# Patient Record
Sex: MALE | Employment: FULL TIME | URBAN - METROPOLITAN AREA
[De-identification: names, ages, dates, MRNs, and addresses within clinical notes are randomized per-mention and may not be internally consistent; named-entity substitution may affect disease eponyms.]

---

## 2017-02-21 ENCOUNTER — OFFICE VISIT (OUTPATIENT)
Dept: FAMILY MEDICINE | Facility: CLINIC | Age: 27
End: 2017-02-21
Payer: COMMERCIAL

## 2017-02-21 VITALS
DIASTOLIC BLOOD PRESSURE: 82 MMHG | BODY MASS INDEX: 30.69 KG/M2 | SYSTOLIC BLOOD PRESSURE: 118 MMHG | HEIGHT: 68 IN | TEMPERATURE: 98.9 F | HEART RATE: 86 BPM | WEIGHT: 202.5 LBS | OXYGEN SATURATION: 99 %

## 2017-02-21 DIAGNOSIS — N34.1 URETHRITIS, NONGONOCOCCAL: Primary | ICD-10-CM

## 2017-02-21 PROCEDURE — 99213 OFFICE O/P EST LOW 20 MIN: CPT | Performed by: FAMILY MEDICINE

## 2017-02-21 RX ORDER — CIPROFLOXACIN 500 MG/1
500 TABLET, FILM COATED ORAL 2 TIMES DAILY
Qty: 20 TABLET | Refills: 0 | Status: SHIPPED | OUTPATIENT
Start: 2017-02-21 | End: 2017-03-03

## 2017-02-21 NOTE — NURSING NOTE
"Chief Complaint   Patient presents with     Urinary Problem     burning senation follow up       Initial /82 (BP Location: Left arm, Patient Position: Chair, Cuff Size: Adult Regular)  Pulse 86  Temp 98.9  F (37.2  C) (Oral)  Ht 5' 7.5\" (1.715 m)  Wt 202 lb 8 oz (91.9 kg)  SpO2 99%  BMI 31.25 kg/m2 Estimated body mass index is 31.25 kg/(m^2) as calculated from the following:    Height as of this encounter: 5' 7.5\" (1.715 m).    Weight as of this encounter: 202 lb 8 oz (91.9 kg).  Medication Reconciliation: complete Mallika Anna MA    "

## 2017-02-21 NOTE — MR AVS SNAPSHOT
"              After Visit Summary   2017    Everette Yu    MRN: [id number]           Patient Information     Date Of Birth          1990        Visit Information        Provider Department      2017 3:40 PM Jorge Womack MD [facility]        Today's Diagnoses     Urethritis, nongonococcal    -  1       Follow-ups after your visit        Who to contact     If you have questions or need follow up information about today's clinic visit or your schedule please contact [facility] directly at [phone].  Normal or non-critical lab and imaging results will be communicated to you by [employer], letter or phone within 4 business days after the clinic has received the results. If you do not hear from us within 7 days, please contact the clinic through [employer] or phone. If you have a critical or abnormal lab result, we will notify you by phone as soon as possible.  Submit refill requests through [employer] or call your pharmacy and they will forward the refill request to us. Please allow 3 business days for your refill to be completed.          Additional Information About Your Visit        MyChart Information     [employer] lets you send messages to your doctor, view your test results, renew your prescriptions, schedule appointments and more. To sign up, go to [email or web address] . Click on \"Log in\" on the left side of the screen, which will take you to the Welcome page. Then click on \"Sign up Now\" on the right side of the page.     You will be asked to enter the access code listed below, as well as some personal information. Please follow the directions to create your username and password.     Your access code is: [email or web address]  Expires: 2017  5:02 PM     Your access code will  in 90 days. If you need help or a new code, please call your [facility] or [phone].        Care EveryWhere ID     This is your Care EveryWhere ID. This could be used by " "other organizations to access your [place] [facility] medical records  [id number]        Your Vitals Were     Pulse Temperature Height Pulse Oximetry BMI (Body Mass Index)       86 98.9  F (37.2  C) (Oral) 5' 7.5\" (1.715 m) 99% 31.25 kg/m2        Blood Pressure from Last 3 Encounters:   02/21/17 118/82   02/23/16 136/80    Weight from Last 3 Encounters:   02/21/17 202 lb 8 oz (91.9 kg)   02/23/16 212 lb (96.2 kg)   02/08/16 220 lb (99.8 kg)              Today, you had the following     No orders found for display         Today's Medication Changes          These changes are accurate as of: 2/21/17  5:02 PM.  If you have any questions, ask your nurse or doctor.               Start taking these medicines.        Dose/Directions    ciprofloxacin 500 MG tablet   Commonly known as:  CIPRO   Used for:  Urethritis, nongonococcal   Started by:  Jorge Womack MD        Dose:  500 mg   Take 1 tablet (500 mg) by mouth 2 times daily for 10 days   Quantity:  20 tablet   Refills:  0            Where to get your medicines      These medications were sent to [facility] [address] [place] [address] AT [address] [address]    Hours:  24-hours Phone:  [phone]     ciprofloxacin 500 MG tablet                Primary Care Provider Office Phone # Fax #    Stana Naida Womack [phone]       [place] [id number] [place] [facility] [address]        Thank you!     Thank you for choosing [facility]  for your care. Our goal is always to provide you with excellent care. Hearing back from our patients is one way we can continue to improve our services. Please take a few minutes to complete the written survey that you may receive in the mail after your visit with us. Thank you!             Your Updated Medication List - Protect others around you: Learn how to safely use, store and throw away your medicines at " www.disposemymeds.org.          This list is accurate as of: 2/21/17  5:02 PM.  Always use your most recent med list.                   Brand Name Dispense Instructions for use    ciprofloxacin 500 MG tablet    CIPRO    20 tablet    Take 1 tablet (500 mg) by mouth 2 times daily for 10 days

## 2017-02-21 NOTE — PROGRESS NOTES
"  SUBJECTIVE:                                                    Everette Yu is a 27 year old male who presents to clinic today for the following health issues:      Genitourinary symptoms  Last week pt was seen at  clinic, where he was seen for dysuria. His chlamydia, gonorrhea and UC which were negative. He still has dysuria with some testicular soreness. Last night he was sweating and he has had dizziness. No urethral discharge, abdominal pain, back pain, nausea or vomiting. Pt notes that ejaculate is more watery then white clumps. Pt thinks that medication was bactrim.          Problem list and histories reviewed & adjusted, as indicated.  Additional history: as documented    Problem list, Medication list, Allergies, and Medical/Social/Surgical histories reviewed in EPIC and updated as appropriate.    ROS:  Constitutional, HEENT, cardiovascular, pulmonary, gi and gu systems are negative, except as otherwise noted.    OBJECTIVE:                                                    /82 (BP Location: Left arm, Patient Position: Chair, Cuff Size: Adult Regular)  Pulse 86  Temp 98.9  F (37.2  C) (Oral)  Ht 5' 7.5\" (1.715 m)  Wt 202 lb 8 oz (91.9 kg)  SpO2 99%  BMI 31.25 kg/m2  Body mass index is 31.25 kg/(m^2).  GENERAL: healthy, alert and no distress  EYES: Eyes grossly normal to inspection  HENT: nose and mouth without ulcers or lesions   (male): normal male genitalia without lesions or urethral discharge, no hernia      Diagnostic Test Results:  none      ASSESSMENT/PLAN:                                                      1. Urethritis, nongonococcal  - negative chlamydia, gonorrhea and UC at , no care everywhere for pt   - ciprofloxacin (CIPRO) 500 MG tablet; Take 1 tablet (500 mg) by mouth 2 times daily for 10 days  Dispense: 20 tablet; Refill: 0  - recommended to continue to stay hydrated   - if not improving then will refer to urology for further evaluation    Jorge Womack, " MD  [facility]

## 2017-03-06 DIAGNOSIS — N34.1 URETHRITIS, NONGONOCOCCAL: ICD-10-CM

## 2017-03-06 RX ORDER — CIPROFLOXACIN 500 MG/1
TABLET, FILM COATED ORAL
Qty: 20 TABLET | Refills: 0 | OUTPATIENT
Start: 2017-03-06

## 2017-03-06 NOTE — TELEPHONE ENCOUNTER
Routing to provider - Vu - please review and advise as appropriate  1. Office visit 2/21/2017 - patient second treatment with Cipro for urinary symptoms  - states his symptoms are returning after completing antibiotic  2. Refill request: ciprofloxacin (CIPRO) 500 MG tablet  3. Per plan - do you want to refer to urology?  4. Do you want recheck UAC?    Thank you,  Elie Orona RN

## 2017-03-06 NOTE — TELEPHONE ENCOUNTER
ciproflox      Last Written Prescription Date:  2/21/17  Last Fill Quantity: 20,   # refills: 0  Last Office Visit with [facility], P or  Health prescribing provider: 2/21/17  Future Office visit:       Routing refill request to provider for review/approval because:  Drug not on the [facility], [facility] or  Health refill protocol or controlled substance

## 2017-03-06 NOTE — TELEPHONE ENCOUNTER
Pt wants to speak with someone about getting a refill of his antibiotics.  Has been off of them for 2 day, and the symptoms are coming back.  OK to leave message on voicemail.  Pt uses [facility] on [address]

## 2017-03-06 NOTE — TELEPHONE ENCOUNTER
Detailed message left for patient reviewing recommendation per Dr. Womack and urology referral information.    MARSHA FowlerN, RN

## 2017-03-06 NOTE — TELEPHONE ENCOUNTER
Everette Yu is a 27 year old male who calls with urinary symptoms.    NURSING ASSESSMENT:  Description:  Patient states initially saw at [facility] on [address] for dysuria - Second treatment with Cipro per Vu - day 7 to 10 symptoms improved.  Patient completed antibiotic 2 days ago and now symptoms returning - Patient requesting a refill  Onset/duration:  2 days  Precip. factors:  Previously prescribed  Associated symptoms:   1. Urinary symptoms   -Testicles - sensitive/sore   -Urgency   -nausea  2. Denies burning, itching, odor, fever, discharge, rash, dark colored, urine, blood in urine, cloudy  3. NEW Lower back pain - Middle - 1/10 - states 'working at home and sometimes this chair can give me back pain - maybe I'm being paranoid about it'    Improves/worsens symptoms:  Somewhat with antibiotic/upon stopping antibiotic  Pain scale (0-10)   1/10  LMP/preg/breast feeding:  N/A  Last exam/Treatment:  2/21/2017  Allergies:   Allergies   Allergen Reactions     Gadolinium        MEDICATIONS:   Taking medication(s) as prescribed? Yes  Taking over the counter medication(s?) No  Any medication side effects? No significant side effects    Any barriers to taking medication(s) as prescribed?  No  Medication(s) improving/managing symptoms?  No  Medication reconciliation completed: Yes      NURSING PLAN: Nursing advice to patient discussed will review plan with Vu - if medication is appropriate or referral to Urology - please continue to drink fluids/maintain good hygiene - advised monitor symptoms and return call for fever, flank pain, new/worsening symptoms, testicular pain - patient verbalized understanding - no further questions at this time    RECOMMENDED DISPOSITION:  Home care advice - see above  Will comply with recommendation: Yes  If further questions/concerns or if symptoms do not improve, worsen or new symptoms develop, call your PCP or [facility] as soon as possible.      Guideline used:  Telephone Triage Protocols for Nurses, Fifth Edition, Kyung Orona RN

## 2017-03-07 ENCOUNTER — TELEPHONE (OUTPATIENT)
Dept: FAMILY MEDICINE | Facility: CLINIC | Age: 27
End: 2017-03-07

## 2017-03-07 DIAGNOSIS — N34.1 URETHRITIS, NONGONOCOCCAL: Primary | ICD-10-CM

## 2017-03-07 RX ORDER — CIPROFLOXACIN 500 MG/1
500 TABLET, FILM COATED ORAL 2 TIMES DAILY
Qty: 20 TABLET | Refills: 0 | Status: SHIPPED | OUTPATIENT
Start: 2017-03-07 | End: 2017-03-17

## 2017-03-07 NOTE — TELEPHONE ENCOUNTER
Writer called patient left non detailed message requesting return call to clinic and ask to speak with nurse    Elie Orona RN

## 2017-03-07 NOTE — TELEPHONE ENCOUNTER
Pt was seen by pcp on 2/21/17 for UTI sx.  Sx have come back.  Pt has urologist appt set up, but not for 3 weeks.  Cipro helped, but sx came back .  Sx- urinary burning, urgency. No fevers.    Routing to pcp.  GUY Snow

## 2017-03-08 ENCOUNTER — PRE VISIT (OUTPATIENT)
Dept: UROLOGY | Facility: CLINIC | Age: 27
End: 2017-03-08

## 2017-03-18 ENCOUNTER — TELEPHONE (OUTPATIENT)
Dept: NURSING | Facility: CLINIC | Age: 27
End: 2017-03-18

## 2017-03-18 NOTE — TELEPHONE ENCOUNTER
Call Type: Triage Call    Presenting Problem: Patient calling requesting refill of Cipro.  Patient reporting he has been treated with Cipro x 2 since 2/21/17  clinic visit. After completing Cipro the symptoms returned with  urinary frequency, tender testicles. Afebrile. Patient reporting he  was seen at [facility] and had negative STD and urine  testing,.  Paged on call MD Dr Rich Metzger through [employer] at 1250 pm to call Magaly at [facility]. Dr Darvin Metzger  returned call reporting that patient should be reseen prior to  further refills. Stating patient has completed 20 days of Cipro and  symptoms have returned. Patient notified and verbalizes  understanding.  Triage Note:  Guideline Title: Urinary Symptoms - Male  Recommended Disposition: See Provider within 8 Hours  Original Inclination: Did not know what to do  Override Disposition:  Intended Action: Follow advice given  Physician Contacted: Yes  Urinary tract symptoms AND any flank or low back, penis or scrotal pain ?  YES  Blood in urine ? NO  Flank pain ? NO  New or worsening signs and symptoms that may indicate shock ? NO  Sudden onset of pain in testicle(s), groin, or lower abdomen AND testicle(s)  swollen or tender to touch ? NO  Unbearable abdominal/pelvic pain ? NO  Current or recent urinary tract instrumentation AND urinary tract symptoms OR no  urine flow ? NO  Urinary tract symptoms AND fever 101.5 F (38.6 C) or higher or vomiting ? NO  No urination for 12 or more hours ? NO  Any temperature elevation in an immunocompromised individual OR frail elderly with  signs of dehydration ? NO  Any other unexpected urinary symptoms following urinary tract or abdominal surgery  within timeframe specified by provider ? NO  Physician Instructions:  Care Advice:

## 2018-01-13 ENCOUNTER — APPOINTMENT (OUTPATIENT)
Dept: CT IMAGING | Facility: CLINIC | Age: 28
End: 2018-01-13
Attending: EMERGENCY MEDICINE
Payer: COMMERCIAL

## 2018-01-13 ENCOUNTER — ANESTHESIA EVENT (OUTPATIENT)
Dept: SURGERY | Facility: CLINIC | Age: 28
End: 2018-01-13
Payer: COMMERCIAL

## 2018-01-13 ENCOUNTER — ANESTHESIA (OUTPATIENT)
Dept: SURGERY | Facility: CLINIC | Age: 28
End: 2018-01-13
Payer: COMMERCIAL

## 2018-01-13 ENCOUNTER — HOSPITAL ENCOUNTER (EMERGENCY)
Facility: CLINIC | Age: 28
Discharge: HOME | End: 2018-01-13
Attending: EMERGENCY MEDICINE | Admitting: SURGERY
Payer: COMMERCIAL

## 2018-01-13 VITALS
HEART RATE: 80 BPM | TEMPERATURE: 99.5 F | BODY MASS INDEX: 30.36 KG/M2 | SYSTOLIC BLOOD PRESSURE: 127 MMHG | OXYGEN SATURATION: 97 % | RESPIRATION RATE: 16 BRPM | DIASTOLIC BLOOD PRESSURE: 76 MMHG | WEIGHT: 205 LBS | HEIGHT: 69 IN

## 2018-01-13 DIAGNOSIS — K35.80 ACUTE APPENDICITIS, UNSPECIFIED ACUTE APPENDICITIS TYPE: ICD-10-CM

## 2018-01-13 LAB
ALBUMIN UR-MCNC: NEGATIVE MG/DL
ANION GAP SERPL CALCULATED.3IONS-SCNC: 5 MMOL/L (ref 3–14)
APPEARANCE UR: CLEAR
BASOPHILS # BLD AUTO: 0 10E9/L (ref 0–0.2)
BASOPHILS NFR BLD AUTO: 0.1 %
BILIRUB UR QL STRIP: NEGATIVE
BUN SERPL-MCNC: 9 MG/DL (ref 7–30)
CALCIUM SERPL-MCNC: 9.3 MG/DL (ref 8.5–10.1)
CHLORIDE SERPL-SCNC: 107 MMOL/L (ref 94–109)
CO2 SERPL-SCNC: 27 MMOL/L (ref 20–32)
COLOR UR AUTO: YELLOW
CREAT SERPL-MCNC: 0.98 MG/DL (ref 0.66–1.25)
CRP SERPL-MCNC: 16.5 MG/L (ref 0–8)
DIFFERENTIAL METHOD BLD: NORMAL
EOSINOPHIL # BLD AUTO: 0 10E9/L (ref 0–0.7)
EOSINOPHIL NFR BLD AUTO: 0.4 %
ERYTHROCYTE [DISTWIDTH] IN BLOOD BY AUTOMATED COUNT: 12.5 % (ref 10–15)
GFR SERPL CREATININE-BSD FRML MDRD: >90 ML/MIN/1.7M2
GLUCOSE SERPL-MCNC: 96 MG/DL (ref 70–99)
GLUCOSE UR STRIP-MCNC: NEGATIVE MG/DL
HCT VFR BLD AUTO: 43.6 % (ref 40–53)
HGB BLD-MCNC: 15.3 G/DL (ref 13.3–17.7)
HGB UR QL STRIP: NEGATIVE
IMM GRANULOCYTES # BLD: 0 10E9/L (ref 0–0.4)
IMM GRANULOCYTES NFR BLD: 0.4 %
KETONES UR STRIP-MCNC: 5 MG/DL
LEUKOCYTE ESTERASE UR QL STRIP: NEGATIVE
LYMPHOCYTES # BLD AUTO: 1.6 10E9/L (ref 0.8–5.3)
LYMPHOCYTES NFR BLD AUTO: 14.7 %
MCH RBC QN AUTO: 30.2 PG (ref 26.5–33)
MCHC RBC AUTO-ENTMCNC: 35.1 G/DL (ref 31.5–36.5)
MCV RBC AUTO: 86 FL (ref 78–100)
MONOCYTES # BLD AUTO: 1 10E9/L (ref 0–1.3)
MONOCYTES NFR BLD AUTO: 9.1 %
MUCOUS THREADS #/AREA URNS LPF: PRESENT /LPF
NEUTROPHILS # BLD AUTO: 8.1 10E9/L (ref 1.6–8.3)
NEUTROPHILS NFR BLD AUTO: 75.3 %
NITRATE UR QL: NEGATIVE
NRBC # BLD AUTO: 0 10*3/UL
NRBC BLD AUTO-RTO: 0 /100
PH UR STRIP: 7 PH (ref 5–7)
PLATELET # BLD AUTO: 226 10E9/L (ref 150–450)
POTASSIUM SERPL-SCNC: 3.7 MMOL/L (ref 3.4–5.3)
RBC # BLD AUTO: 5.07 10E12/L (ref 4.4–5.9)
RBC #/AREA URNS AUTO: 1 /HPF (ref 0–2)
SODIUM SERPL-SCNC: 139 MMOL/L (ref 133–144)
SOURCE: ABNORMAL
SP GR UR STRIP: 1.01 (ref 1–1.03)
UROBILINOGEN UR STRIP-MCNC: NORMAL MG/DL (ref 0–2)
WBC # BLD AUTO: 10.7 10E9/L (ref 4–11)
WBC #/AREA URNS AUTO: <1 /HPF (ref 0–2)

## 2018-01-13 PROCEDURE — 27210794 ZZH OR GENERAL SUPPLY STERILE: Performed by: SURGERY

## 2018-01-13 PROCEDURE — 27210995 ZZH RX 272: Performed by: EMERGENCY MEDICINE

## 2018-01-13 PROCEDURE — 37000008 ZZH ANESTHESIA TECHNICAL FEE, 1ST 30 MIN: Performed by: SURGERY

## 2018-01-13 PROCEDURE — 25000566 ZZH SEVOFLURANE, EA 15 MIN: Performed by: SURGERY

## 2018-01-13 PROCEDURE — 44970 LAPAROSCOPY APPENDECTOMY: CPT | Mod: AS | Performed by: PHYSICIAN ASSISTANT

## 2018-01-13 PROCEDURE — 88304 TISSUE EXAM BY PATHOLOGIST: CPT | Performed by: SURGERY

## 2018-01-13 PROCEDURE — 74177 CT ABD & PELVIS W/CONTRAST: CPT

## 2018-01-13 PROCEDURE — 25000125 ZZHC RX 250: Performed by: NURSE ANESTHETIST, CERTIFIED REGISTERED

## 2018-01-13 PROCEDURE — 36000058 ZZH SURGERY LEVEL 3 EA 15 ADDTL MIN: Performed by: SURGERY

## 2018-01-13 PROCEDURE — 96375 TX/PRO/DX INJ NEW DRUG ADDON: CPT

## 2018-01-13 PROCEDURE — 80048 BASIC METABOLIC PNL TOTAL CA: CPT | Performed by: EMERGENCY MEDICINE

## 2018-01-13 PROCEDURE — 71000013 ZZH RECOVERY PHASE 1 LEVEL 1 EA ADDTL HR: Performed by: SURGERY

## 2018-01-13 PROCEDURE — 25000128 H RX IP 250 OP 636: Performed by: NURSE ANESTHETIST, CERTIFIED REGISTERED

## 2018-01-13 PROCEDURE — 36000056 ZZH SURGERY LEVEL 3 1ST 30 MIN: Performed by: SURGERY

## 2018-01-13 PROCEDURE — 81001 URINALYSIS AUTO W/SCOPE: CPT | Performed by: EMERGENCY MEDICINE

## 2018-01-13 PROCEDURE — 25000128 H RX IP 250 OP 636: Performed by: EMERGENCY MEDICINE

## 2018-01-13 PROCEDURE — 25000125 ZZHC RX 250: Performed by: EMERGENCY MEDICINE

## 2018-01-13 PROCEDURE — 25800025 ZZH RX 258: Performed by: SURGERY

## 2018-01-13 PROCEDURE — 99285 EMERGENCY DEPT VISIT HI MDM: CPT | Mod: 25

## 2018-01-13 PROCEDURE — 88304 TISSUE EXAM BY PATHOLOGIST: CPT | Mod: 26 | Performed by: SURGERY

## 2018-01-13 PROCEDURE — 25000125 ZZHC RX 250: Performed by: SURGERY

## 2018-01-13 PROCEDURE — 85025 COMPLETE CBC W/AUTO DIFF WBC: CPT | Performed by: EMERGENCY MEDICINE

## 2018-01-13 PROCEDURE — 25000128 H RX IP 250 OP 636: Performed by: SURGERY

## 2018-01-13 PROCEDURE — 25000132 ZZH RX MED GY IP 250 OP 250 PS 637: Performed by: PHYSICIAN ASSISTANT

## 2018-01-13 PROCEDURE — 86140 C-REACTIVE PROTEIN: CPT | Performed by: EMERGENCY MEDICINE

## 2018-01-13 PROCEDURE — 37000009 ZZH ANESTHESIA TECHNICAL FEE, EACH ADDTL 15 MIN: Performed by: SURGERY

## 2018-01-13 PROCEDURE — 40000170 ZZH STATISTIC PRE-PROCEDURE ASSESSMENT II: Performed by: SURGERY

## 2018-01-13 PROCEDURE — 71000012 ZZH RECOVERY PHASE 1 LEVEL 1 FIRST HR: Performed by: SURGERY

## 2018-01-13 PROCEDURE — 44970 LAPAROSCOPY APPENDECTOMY: CPT | Performed by: SURGERY

## 2018-01-13 PROCEDURE — 96374 THER/PROPH/DIAG INJ IV PUSH: CPT | Mod: 59

## 2018-01-13 PROCEDURE — 99204 OFFICE O/P NEW MOD 45 MIN: CPT | Mod: 57 | Performed by: SURGERY

## 2018-01-13 PROCEDURE — 27210995 ZZH RX 272: Performed by: SURGERY

## 2018-01-13 RX ORDER — BUPIVACAINE HYDROCHLORIDE 2.5 MG/ML
INJECTION, SOLUTION INFILTRATION; PERINEURAL PRN
Status: DISCONTINUED | OUTPATIENT
Start: 2018-01-13 | End: 2018-01-13 | Stop reason: HOSPADM

## 2018-01-13 RX ORDER — ONDANSETRON 2 MG/ML
4 INJECTION INTRAMUSCULAR; INTRAVENOUS EVERY 30 MIN PRN
Status: DISCONTINUED | OUTPATIENT
Start: 2018-01-13 | End: 2018-01-13 | Stop reason: HOSPADM

## 2018-01-13 RX ORDER — PROPOFOL 10 MG/ML
INJECTION, EMULSION INTRAVENOUS PRN
Status: DISCONTINUED | OUTPATIENT
Start: 2018-01-13 | End: 2018-01-13

## 2018-01-13 RX ORDER — HYDROCODONE BITARTRATE AND ACETAMINOPHEN 5; 325 MG/1; MG/1
1 TABLET ORAL
Status: COMPLETED | OUTPATIENT
Start: 2018-01-13 | End: 2018-01-13

## 2018-01-13 RX ORDER — DEXAMETHASONE SODIUM PHOSPHATE 4 MG/ML
INJECTION, SOLUTION INTRA-ARTICULAR; INTRALESIONAL; INTRAMUSCULAR; INTRAVENOUS; SOFT TISSUE PRN
Status: DISCONTINUED | OUTPATIENT
Start: 2018-01-13 | End: 2018-01-13

## 2018-01-13 RX ORDER — SODIUM CHLORIDE, SODIUM LACTATE, POTASSIUM CHLORIDE, CALCIUM CHLORIDE 600; 310; 30; 20 MG/100ML; MG/100ML; MG/100ML; MG/100ML
INJECTION, SOLUTION INTRAVENOUS CONTINUOUS
Status: DISCONTINUED | OUTPATIENT
Start: 2018-01-13 | End: 2018-01-13 | Stop reason: HOSPADM

## 2018-01-13 RX ORDER — ONDANSETRON 4 MG/1
4 TABLET, ORALLY DISINTEGRATING ORAL EVERY 30 MIN PRN
Status: DISCONTINUED | OUTPATIENT
Start: 2018-01-13 | End: 2018-01-13 | Stop reason: HOSPADM

## 2018-01-13 RX ORDER — GLYCOPYRROLATE 0.2 MG/ML
INJECTION, SOLUTION INTRAMUSCULAR; INTRAVENOUS PRN
Status: DISCONTINUED | OUTPATIENT
Start: 2018-01-13 | End: 2018-01-13

## 2018-01-13 RX ORDER — NEOSTIGMINE METHYLSULFATE 1 MG/ML
VIAL (ML) INJECTION PRN
Status: DISCONTINUED | OUTPATIENT
Start: 2018-01-13 | End: 2018-01-13

## 2018-01-13 RX ORDER — FENTANYL CITRATE 0.05 MG/ML
25-50 INJECTION, SOLUTION INTRAMUSCULAR; INTRAVENOUS
Status: DISCONTINUED | OUTPATIENT
Start: 2018-01-13 | End: 2018-01-13 | Stop reason: HOSPADM

## 2018-01-13 RX ORDER — MAGNESIUM HYDROXIDE 1200 MG/15ML
LIQUID ORAL PRN
Status: DISCONTINUED | OUTPATIENT
Start: 2018-01-13 | End: 2018-01-13 | Stop reason: HOSPADM

## 2018-01-13 RX ORDER — MEPERIDINE HYDROCHLORIDE 25 MG/ML
12.5 INJECTION INTRAMUSCULAR; INTRAVENOUS; SUBCUTANEOUS EVERY 5 MIN PRN
Status: DISCONTINUED | OUTPATIENT
Start: 2018-01-13 | End: 2018-01-13 | Stop reason: HOSPADM

## 2018-01-13 RX ORDER — LIDOCAINE HYDROCHLORIDE 20 MG/ML
INJECTION, SOLUTION INFILTRATION; PERINEURAL PRN
Status: DISCONTINUED | OUTPATIENT
Start: 2018-01-13 | End: 2018-01-13

## 2018-01-13 RX ORDER — KETOROLAC TROMETHAMINE 30 MG/ML
INJECTION, SOLUTION INTRAMUSCULAR; INTRAVENOUS PRN
Status: DISCONTINUED | OUTPATIENT
Start: 2018-01-13 | End: 2018-01-13

## 2018-01-13 RX ORDER — HYDROCODONE BITARTRATE AND ACETAMINOPHEN 5; 325 MG/1; MG/1
1-2 TABLET ORAL EVERY 6 HOURS PRN
Qty: 30 TABLET | Refills: 0 | Status: SHIPPED | OUTPATIENT
Start: 2018-01-13

## 2018-01-13 RX ORDER — SODIUM CHLORIDE, SODIUM LACTATE, POTASSIUM CHLORIDE, CALCIUM CHLORIDE 600; 310; 30; 20 MG/100ML; MG/100ML; MG/100ML; MG/100ML
INJECTION, SOLUTION INTRAVENOUS CONTINUOUS PRN
Status: DISCONTINUED | OUTPATIENT
Start: 2018-01-13 | End: 2018-01-13

## 2018-01-13 RX ORDER — NALOXONE HYDROCHLORIDE 0.4 MG/ML
.1-.4 INJECTION, SOLUTION INTRAMUSCULAR; INTRAVENOUS; SUBCUTANEOUS
Status: DISCONTINUED | OUTPATIENT
Start: 2018-01-13 | End: 2018-01-13 | Stop reason: HOSPADM

## 2018-01-13 RX ORDER — IOPAMIDOL 755 MG/ML
103 INJECTION, SOLUTION INTRAVASCULAR ONCE
Status: COMPLETED | OUTPATIENT
Start: 2018-01-13 | End: 2018-01-13

## 2018-01-13 RX ORDER — KETOROLAC TROMETHAMINE 30 MG/ML
30 INJECTION, SOLUTION INTRAMUSCULAR; INTRAVENOUS
Status: DISCONTINUED | OUTPATIENT
Start: 2018-01-13 | End: 2018-01-13 | Stop reason: HOSPADM

## 2018-01-13 RX ORDER — ONDANSETRON 2 MG/ML
INJECTION INTRAMUSCULAR; INTRAVENOUS PRN
Status: DISCONTINUED | OUTPATIENT
Start: 2018-01-13 | End: 2018-01-13

## 2018-01-13 RX ORDER — SENNOSIDES A AND B 8.6 MG/1
1 TABLET, FILM COATED ORAL 2 TIMES DAILY
Qty: 10 TABLET | Refills: 1 | Status: SHIPPED | OUTPATIENT
Start: 2018-01-13

## 2018-01-13 RX ORDER — EPINEPHRINE 1 MG/ML
0.3 INJECTION, SOLUTION, CONCENTRATE INTRAVENOUS
Status: COMPLETED | OUTPATIENT
Start: 2018-01-13 | End: 2018-01-13

## 2018-01-13 RX ORDER — CALCIUM CARBONATE 500 MG/1
2 TABLET, CHEWABLE ORAL DAILY PRN
COMMUNITY

## 2018-01-13 RX ORDER — FENTANYL CITRATE 50 UG/ML
INJECTION, SOLUTION INTRAMUSCULAR; INTRAVENOUS PRN
Status: DISCONTINUED | OUTPATIENT
Start: 2018-01-13 | End: 2018-01-13

## 2018-01-13 RX ORDER — HYDROMORPHONE HYDROCHLORIDE 1 MG/ML
.3-.5 INJECTION, SOLUTION INTRAMUSCULAR; INTRAVENOUS; SUBCUTANEOUS EVERY 5 MIN PRN
Status: DISCONTINUED | OUTPATIENT
Start: 2018-01-13 | End: 2018-01-13 | Stop reason: HOSPADM

## 2018-01-13 RX ORDER — MORPHINE SULFATE 4 MG/ML
6 INJECTION, SOLUTION INTRAMUSCULAR; INTRAVENOUS ONCE
Status: COMPLETED | OUTPATIENT
Start: 2018-01-13 | End: 2018-01-13

## 2018-01-13 RX ADMIN — DEXAMETHASONE SODIUM PHOSPHATE 4 MG: 4 INJECTION, SOLUTION INTRA-ARTICULAR; INTRALESIONAL; INTRAMUSCULAR; INTRAVENOUS; SOFT TISSUE at 16:09

## 2018-01-13 RX ADMIN — MIDAZOLAM 2 MG: 1 INJECTION INTRAMUSCULAR; INTRAVENOUS at 15:53

## 2018-01-13 RX ADMIN — NEOSTIGMINE METHYLSULFATE 5 MG: 1 INJECTION INTRAMUSCULAR; INTRAVENOUS; SUBCUTANEOUS at 16:40

## 2018-01-13 RX ADMIN — FENTANYL CITRATE 25 MCG: 50 INJECTION, SOLUTION INTRAMUSCULAR; INTRAVENOUS at 17:38

## 2018-01-13 RX ADMIN — LIDOCAINE HYDROCHLORIDE 100 MG: 20 INJECTION, SOLUTION INFILTRATION; PERINEURAL at 15:56

## 2018-01-13 RX ADMIN — FENTANYL CITRATE 50 MCG: 50 INJECTION, SOLUTION INTRAMUSCULAR; INTRAVENOUS at 16:02

## 2018-01-13 RX ADMIN — SODIUM CHLORIDE, POTASSIUM CHLORIDE, SODIUM LACTATE AND CALCIUM CHLORIDE: 600; 310; 30; 20 INJECTION, SOLUTION INTRAVENOUS at 15:00

## 2018-01-13 RX ADMIN — SODIUM CHLORIDE 71 ML: 9 INJECTION, SOLUTION INTRAVENOUS at 13:18

## 2018-01-13 RX ADMIN — HYDROCODONE BITARTRATE AND ACETAMINOPHEN 1 TABLET: 5; 325 TABLET ORAL at 17:31

## 2018-01-13 RX ADMIN — FENTANYL CITRATE 25 MCG: 50 INJECTION, SOLUTION INTRAMUSCULAR; INTRAVENOUS at 17:51

## 2018-01-13 RX ADMIN — ONDANSETRON 4 MG: 2 INJECTION INTRAMUSCULAR; INTRAVENOUS at 16:09

## 2018-01-13 RX ADMIN — SUCCINYLCHOLINE CHLORIDE 100 MG: 20 INJECTION, SOLUTION INTRAMUSCULAR; INTRAVENOUS at 15:56

## 2018-01-13 RX ADMIN — Medication 0.3 MG: at 17:14

## 2018-01-13 RX ADMIN — WATER 1 G: 1 INJECTION INTRAMUSCULAR; INTRAVENOUS; SUBCUTANEOUS at 14:20

## 2018-01-13 RX ADMIN — ROCURONIUM BROMIDE 35 MG: 10 INJECTION INTRAVENOUS at 16:10

## 2018-01-13 RX ADMIN — GLYCOPYRROLATE 0.8 MG: 0.2 INJECTION, SOLUTION INTRAMUSCULAR; INTRAVENOUS at 16:40

## 2018-01-13 RX ADMIN — FENTANYL CITRATE 50 MCG: 50 INJECTION, SOLUTION INTRAMUSCULAR; INTRAVENOUS at 16:57

## 2018-01-13 RX ADMIN — MORPHINE SULFATE 6 MG: 4 INJECTION INTRAVENOUS at 14:39

## 2018-01-13 RX ADMIN — KETOROLAC TROMETHAMINE 30 MG: 30 INJECTION, SOLUTION INTRAMUSCULAR at 16:38

## 2018-01-13 RX ADMIN — Medication 0.3 MG: at 17:17

## 2018-01-13 RX ADMIN — FENTANYL CITRATE 50 MCG: 50 INJECTION, SOLUTION INTRAMUSCULAR; INTRAVENOUS at 15:53

## 2018-01-13 RX ADMIN — FENTANYL CITRATE 50 MCG: 50 INJECTION, SOLUTION INTRAMUSCULAR; INTRAVENOUS at 16:09

## 2018-01-13 RX ADMIN — IOPAMIDOL 103 ML: 755 INJECTION, SOLUTION INTRAVENOUS at 13:18

## 2018-01-13 RX ADMIN — SODIUM CHLORIDE, POTASSIUM CHLORIDE, SODIUM LACTATE AND CALCIUM CHLORIDE: 600; 310; 30; 20 INJECTION, SOLUTION INTRAVENOUS at 16:13

## 2018-01-13 RX ADMIN — PROPOFOL 300 MG: 10 INJECTION, EMULSION INTRAVENOUS at 15:56

## 2018-01-13 RX ADMIN — SODIUM CHLORIDE, POTASSIUM CHLORIDE, SODIUM LACTATE AND CALCIUM CHLORIDE: 600; 310; 30; 20 INJECTION, SOLUTION INTRAVENOUS at 15:53

## 2018-01-13 ASSESSMENT — ENCOUNTER SYMPTOMS
HEMATURIA: 0
DYSURIA: 0
CHILLS: 1
ABDOMINAL PAIN: 1
FEVER: 0
DIAPHORESIS: 1

## 2018-01-13 NOTE — PHARMACY-ADMISSION MEDICATION HISTORY
Admission medication history interview status for the 1/13/2018  admission is complete. See EPIC admission navigator for prior to admission medications     Medication history source reliability:Good    Actions taken by pharmacist (provider contacted, etc):discussed with patient      Additional medication history information not noted on PTA med list :None    Medication reconciliation/reorder completed by provider prior to medication history? No    Time spent in this activity: 10 minutes     Prior to Admission medications    Medication Sig Last Dose Taking? Auth Provider   calcium carbonate (TUMS) 500 MG chewable tablet Take 2 chew tab by mouth daily as needed for heartburn 1/13/2018 at Unknown time Yes Unknown, Entered By History   MELATONIN PO Take 3 mg by mouth nightly as needed 1/12/2018 at Unknown time Yes Unknown, Entered By History   magnesium hydroxide (MILK OF MAGNESIA) 400 MG/5ML suspension Take 30 mLs by mouth daily as needed for constipation or heartburn 1/13/2018 at Unknown time Yes Unknown, Entered By History

## 2018-01-13 NOTE — ED NOTES
Pt reports increase in abdominal pain, now constant and rated 8/10. Pt requesting analgesia. Pending Surgery consult. Dr. Mejia made aware.

## 2018-01-13 NOTE — DISCHARGE INSTRUCTIONS
[facility]   Discharge Instructions for Laparoscopic Appendectomy          ACTIVITY    You may climb stairs.    You may lift or exercise after four days if comfortable.    You may drive without restrictions when not using prescription pain medication.    You may return to work when comfortable.    BATHING    You may take a shower.     Do not submerge No baths, pools or hot tubs for 2 weeks    You may have steri-strips (looks like tape) on your incision.  You may remove them after 1 week     DIET    Return to the diet you were on before surgery.    CALL YOUR PHYSICIAN IF YOU HAVE    Chills or fever above 101   F.    Drainage from the incisions    Significant bleeding    Pain not relieved by your pain medication or rest.    Increasing pain after the first 48 hours    HELPFUL HINTS    You may experience shoulder pain which is due to the air injected during the procedure.  This is temporary and usually passes in a day.    Same Day Surgery Discharge Instructions for  Sedation and General Anesthesia       It's not unusual to feel dizzy, light-headed or faint for up to 24 hours after surgery or while taking pain medication.  If you have these symptoms: sit for a few minutes before standing and have someone assist you when you get up to walk or use the bathroom.      You should rest and relax for the next 24 hours. We recommend you make arrangements to have an adult stay with you for at least 24 hours after your discharge.  Avoid hazardous and strenuous activity.      DO NOT DRIVE any vehicle or operate mechanical equipment for 24 hours following the end of your surgery.  Even though you may feel normal, your reactions may be affected by the medication you have received.      Do not drink alcoholic beverages for 24 hours following surgery.       Slowly progress to your regular diet as you feel able. It's not unusual to feel nauseated and/or vomit after receiving anesthesia.  If you develop these symptoms,  drink clear liquids (apple juice, ginger ale, broth, 7-up, etc. ) until you feel better.  If your nausea and vomiting persists for 24 hours, please notify your surgeon.        All narcotic pain medications, along with inactivity and anesthesia, can cause constipation. Drinking plenty of liquids and increasing fiber intake will help.      For any questions of a medical nature, call your surgeon.      Do not make important decisions for 24 hours.      If you had general anesthesia, you may have a sore throat for a couple of days related to the breathing tube used during surgery.  You may use Cepacol lozenges to help with this discomfort.  If it worsens or if you develop a fever, contact your surgeon.       If you feel your pain is not well managed with the pain medications prescribed by your surgeon, please contact your surgeon's office to let them know so they can address your concerns.       **If you have questions or concerns about your procedure,   call Dr Brewer at  [phone]**    While you were at the hospital today you received Toradol, an antiinflammatory medication similar to Ibuprofen.  You should not take other antiinflammatory medication, such as Ibuprofen, Motrin, Advil, Aleve, Naprosyn, etc, until 10:30pm.

## 2018-01-13 NOTE — ANESTHESIA CARE TRANSFER NOTE
Patient: Everette Yu    Procedure(s):  Laparoscopic Appendectomy - Wound Class: II-Clean Contaminated    Diagnosis: unknown  Diagnosis Additional Information: No value filed.    Anesthesia Type:   General, RSI     Note:  Airway :Face Mask  Patient transferred to:PACU  Handoff Report: Identifed the Patient, Identified the Reponsible Provider, Reviewed the pertinent medical history, Discussed the surgical course, Reviewed Intra-OP anesthesia mangement and issues during anesthesia, Set expectations for post-procedure period and Allowed opportunity for questions and acknowledgement of understanding      Vitals: (Last set prior to Anesthesia Care Transfer)    CRNA VITALS  1/13/2018 1618 - 1/13/2018 1657      1/13/2018             Pulse: 106    SpO2: 93 %    Resp Rate (observed): 20    Resp Rate (set): 10                Electronically Signed By: MAXIMILIANO Riggs CRNA  January 13, 2018  4:57 PM

## 2018-01-13 NOTE — CONSULTS
General Surgery [facility] Consultation/H&P    Everette Yu MRN#: [id number]   Age: 27 year old YOB: 1990     Date of Admission:          1/13/2018  Reason for consult/H&P: Abdominal pain   Surgeon:      Salbador Brewer MD                  Chief Complaint:   Abdominal pain, right lower quadrant         History of Present Illness:   This patient is a 27 year old  male who presented to the [facility] ER with abdominal pain. This developed over two days.  He has had no similar pain in the past. He has had no abdominal operations.  He denies trauma to the abdomen. Denies fever, chills, nausea, vomiting, change in BM or urination.   Denies having any previous episodes or abdominal surgery. History is obtained from the patient and chart.         Past Medical History:    has a past medical history of Chronic prostatitis.          Past Surgical History:     Past Surgical History:   Procedure Laterality Date     ORTHOPEDIC SURGERY      acl X 2             Medications:     Prior to Admission medications    Medication Sig Start Date End Date Taking? Authorizing Provider   calcium carbonate (TUMS) 500 MG chewable tablet Take 2 chew tab by mouth daily as needed for heartburn   Yes Unknown, Entered By History   MELATONIN PO Take 3 mg by mouth nightly as needed   Yes Unknown, Entered By History   magnesium hydroxide (MILK OF MAGNESIA) 400 MG/5ML suspension Take 30 mLs by mouth daily as needed for constipation or heartburn   Yes Unknown, Entered By History            Allergies:   No Known Allergies         Social History:     Social History   Substance Use Topics     Smoking status: Never Smoker     Smokeless tobacco: Never Used     Alcohol use Yes             Family History:    This patient has no significant relevant family history.  Family history is reviewed in detail.          Review of Systems:   Brief ROS is negative other than noted in the HPI.  C: NEGATIVE for fever, chills, change in  "weight  R: NEGATIVE for significant cough or SOB  CV: NEGATIVE for chest pain, palpitations or peripheral edema  GI:  NEGATIVE for dysuria, heartburn, or change in bowel habits  H: NEGATIVE for bleeding problems         Physical Exam:   Blood pressure 146/90, pulse 80, temperature 98.3  F (36.8  C), resp. rate 20, height 1.753 m (5' 9\"), weight 93 kg (205 lb), SpO2 98 %.       General - This is a well developed, well nourished male .  HEENT - Normocephalic. Atraumatic. Moist mucous membranes. Pupils equal.  No scleral icterus. Nose normal.  Neck - Supple without masses. No cervical adenopathy or thyromegaly  Lungs - Breathing not labored  Chest - Not tender. CVA's nontender  Heart - Regular rate & rhythm   Abdomen - Soft, tender in RLQ nondistended with +bowel sounds, no organomegaly.  Extremities - Moves all extremities. Warm without edema.  Neurologic - Nonfocal.          Data:   Labs:  Recent Labs   Lab Test  01/13/18   1230  02/23/16   1605   WBC  10.7   --    HGB  15.3  15.6   HCT  43.6   --    PLT  226   --      Recent Labs   Lab Test  01/13/18   1230   POTASSIUM  3.7   CHLORIDE  107   CO2  27   BUN  9   CR  0.98     No lab results found.  No lab results found.  Recent Labs   Lab Test  01/13/18   1230   LORE  9.3     Recent Labs   Lab Test  01/13/18   1230   ANIONGAP  5   PROTEIN  Negative       CT scan of the abdomen: reviewed. Looks like appendicitis without rupture    Ultrasound of the abdomen:  Not done               Assessment:   Probable appendicitis         Plan:      Lap exploration. Risks and options reviewed with patient and wife     I have discussed the history, physical, and plan with the patient.   Salbador Brewer M.D.       "

## 2018-01-13 NOTE — ED NOTES
"[facility]  ED Nurse Handoff Report    ED Chief complaint: Abdominal Pain (lower abdominal pain over past 2 days, worse since last night)      ED Diagnosis:   Final diagnoses:   Acute appendicitis, unspecified acute appendicitis type       Code Status: Full Code    Allergies: No Known Allergies    Activity level - Baseline/Home:  Independent    Activity Level - Current:   Independent     Needed?: No    Isolation: No  Infection: Not Applicable    Bariatric?: No    Vital Signs:   Vitals:    01/13/18 1213 01/13/18 1355   BP: (!) 146/97 144/84   Pulse: 79 80   Resp: 18    Temp: 98.3  F (36.8  C)    SpO2: 99% 99%   Weight: 93 kg (205 lb)    Height: 1.753 m (5' 9\")        Cardiac Rhythm: Heart regular    Is this patient confused?: No    Patient Report: Initial Complaint: Periumbilical/suprapubic abdominal pain since 1/11/18, worsened 1/12/18. No nausea or vomiting. Subjective fevers and chills present with constipation. Last BM 1/12/18 without blood. Also having urinary hesitancy. Hx of chronic prostatitis - last antibiotics 7 months ago. Localized RLQ and periumbilical tenderness to palpation with rebound tenderness. Labs unremarkable aside from elevated CRP. CT showed acute appendicitis. No history of abdominal surgeries. Surgery consulted - pt to go to pre-op from ED at 1435. Received Invanz (1g) & morphine (6mg). Of note, the patient thought he could have a reaction to IV dye due to a prior HepB vaccine administered while he lived in [place]. He was told at that point to not take dye. Today, we gave him contrast dye (IV only) and he did not have any reaction. SC epinephrine was ordered PRN, but was not given. Dye removed from allergy list.    Family Comments: Wife, Magaly, at bedside. Very pleasant.    OBS brochure/video discussed/provided to patient: N/A    ED Medications:   Medications   morphine (PF) injection 6 mg (not administered)   EPINEPHrine PF (ADRENALIN) injection 0.3 mg ( " Intramuscular Return to Cabinet 1/13/18 1337)   100mL saline flush (71 mLs Intravenous Given 1/13/18 1318)   iopamidol (ISOVUE-370) solution 103 mL (103 mLs Intravenous Given 1/13/18 1318)   ertapenem (INVanz) 1 g in 10 mL SWFI for IVP (1 g Intravenous Given 1/13/18 1420)       Drips infusing?:  No      ED NURSE PHONE NUMBER: [phone]

## 2018-01-13 NOTE — IP AVS SNAPSHOT
[place] [address]    [place] [address]    Phone:  [phone]                                       After Visit Summary   1/13/2018    Everette Yu    MRN: [id number]           After Visit Summary Signature Page     I have received my discharge instructions, and my questions have been answered. I have discussed any challenges I see with this plan with the nurse or doctor.    ..........................................................................................................................................  Patient/Patient Representative Signature      ..........................................................................................................................................  Patient Representative Print Name and Relationship to Patient    ..................................................               ................................................  Date                                            Time    ..........................................................................................................................................  Reviewed by Signature/Title    ...................................................              ..............................................  Date                                                            Time

## 2018-01-13 NOTE — IP AVS SNAPSHOT
MRN:[id number]                      After Visit Summary   1/13/2018    Everette Yu    MRN: [id number]           Thank you!     Thank you for choosing [facility] for your care. Our goal is always to provide you with excellent care. Hearing back from our patients is one way we can continue to improve our services. Please take a few minutes to complete the written survey that you may receive in the mail after you visit with us. Thank you!        Patient Information     Date Of Birth          1990        About your hospital stay     You were admitted on:  N/A You last received care in the:  [facility] PACU    You were discharged on:  January 13, 2018       Who to Call     For medical emergencies, please call 911.  For non-urgent questions about your medical care, please call your primary care provider or clinic, [phone]  For questions related to your surgery, please call your surgery clinic        Attending Provider     Provider Magdiel Rosales MD Emergency Medicine       Primary Care Provider Office Phone # Fax #    [facility] [phone]      After Care Instructions     Discharge Instructions       Follow up with Dr. Brewer or PA, at [address] [facility] [address], within 2 weeks, call office for appointment at [phone].    IF you are doing well and have no questions or concerns, you may call our nurse to update on your condition instead of coming in for appointment.                  Further instructions from your care team       [facility]   Discharge Instructions for Laparoscopic Appendectomy          ACTIVITY    You may climb stairs.    You may lift or exercise after four days if comfortable.    You may drive without restrictions when not using prescription pain medication.    You may return to work when comfortable.    BATHING    You may take a shower.     Do not submerge No baths, pools or hot  tubs for 2 weeks    You may have steri-strips (looks like tape) on your incision.  You may remove them after 1 week     DIET    Return to the diet you were on before surgery.    CALL YOUR PHYSICIAN IF YOU HAVE    Chills or fever above 101   F.    Drainage from the incisions    Significant bleeding    Pain not relieved by your pain medication or rest.    Increasing pain after the first 48 hours    HELPFUL HINTS    You may experience shoulder pain which is due to the air injected during the procedure.  This is temporary and usually passes in a day.    Same Day Surgery Discharge Instructions for  Sedation and General Anesthesia       It's not unusual to feel dizzy, light-headed or faint for up to 24 hours after surgery or while taking pain medication.  If you have these symptoms: sit for a few minutes before standing and have someone assist you when you get up to walk or use the bathroom.      You should rest and relax for the next 24 hours. We recommend you make arrangements to have an adult stay with you for at least 24 hours after your discharge.  Avoid hazardous and strenuous activity.      DO NOT DRIVE any vehicle or operate mechanical equipment for 24 hours following the end of your surgery.  Even though you may feel normal, your reactions may be affected by the medication you have received.      Do not drink alcoholic beverages for 24 hours following surgery.       Slowly progress to your regular diet as you feel able. It's not unusual to feel nauseated and/or vomit after receiving anesthesia.  If you develop these symptoms, drink clear liquids (apple juice, ginger ale, broth, 7-up, etc. ) until you feel better.  If your nausea and vomiting persists for 24 hours, please notify your surgeon.        All narcotic pain medications, along with inactivity and anesthesia, can cause constipation. Drinking plenty of liquids and increasing fiber intake will help.      For any questions of a medical nature, call your  "surgeon.      Do not make important decisions for 24 hours.      If you had general anesthesia, you may have a sore throat for a couple of days related to the breathing tube used during surgery.  You may use Cepacol lozenges to help with this discomfort.  If it worsens or if you develop a fever, contact your surgeon.       If you feel your pain is not well managed with the pain medications prescribed by your surgeon, please contact your surgeon's office to let them know so they can address your concerns.       **If you have questions or concerns about your procedure,   call Dr Brewer at  [phone]**    While you were at the hospital today you received Toradol, an antiinflammatory medication similar to Ibuprofen.  You should not take other antiinflammatory medication, such as Ibuprofen, Motrin, Advil, Aleve, Naprosyn, etc, until 10:30pm.           Pending Results     No orders found from 2018 to 2018.            Admission Information     Date & Time Department Dept. Phone    2018 [facility] PACU [phone]      Your Vitals Were     Blood Pressure Pulse Temperature Respirations Height Weight    138/87 80 99.3  F (37.4  C) 16 1.753 m (5' 9\") 93 kg (205 lb)    Pulse Oximetry BMI (Body Mass Index)                98% 30.27 kg/m2          MyChart Information     [employer] lets you send messages to your doctor, view your test results, renew your prescriptions, schedule appointments and more. To sign up, go to [email or web address] . Click on \"Log in\" on the left side of the screen, which will take you to the Welcome page. Then click on \"Sign up Now\" on the right side of the page.     You will be asked to enter the access code listed below, as well as some personal information. Please follow the directions to create your username and password.     Your access code is: [email or web address]  Expires: 2018  5:22 PM     Your access code will  in 90 days. If you need help or a new code, please call " your [place] clinic or [phone].        Care EveryWhere ID     This is your Care EveryWhere ID. This could be used by other organizations to access your [place] medical records  [id number]        Equal Access to Services     PAUL OSHEA : Hadii aad ku hadluz mariaesperanza Felix, waaxda luqadaha, qaybta kaalmada adebriada, maliha blaynein hayaadarius nogueramaribeltavares trejo. So [facility] [phone].    ATENCIÓN: Si habla español, tiene a holloway disposición servicios gratuitos de asistencia lingüística. Llame al [phone].    We comply with applicable federal civil rights laws and [place] laws. We do not discriminate on the basis of race, color, national origin, age, disability, sex, sexual orientation, or gender identity.               Review of your medicines      START taking        Dose / Directions    HYDROcodone-acetaminophen 5-325 MG per tablet   Commonly known as:  NORCO   Used for:  Acute appendicitis, unspecified acute appendicitis type        Dose:  1-2 tablet   Take 1-2 tablets by mouth every 6 hours as needed for moderate to severe pain   Quantity:  30 tablet   Refills:  0       senna 8.6 MG tablet   Commonly known as:  SENOKOT   Used for:  Acute appendicitis, unspecified acute appendicitis type        Dose:  1 tablet   Take 1 tablet by mouth 2 times daily while on narcotics   Quantity:  10 tablet   Refills:  1         CONTINUE these medicines which have NOT CHANGED        Dose / Directions    calcium carbonate 500 MG chewable tablet   Commonly known as:  TUMS        Dose:  2 chew tab   Take 2 chew tab by mouth daily as needed for heartburn   Refills:  0       magnesium hydroxide 400 MG/5ML suspension   Commonly known as:  MILK OF MAGNESIA        Dose:  30 mL   Take 30 mLs by mouth daily as needed for constipation or heartburn   Refills:  0       MELATONIN PO        Dose:  3 mg   Take 3 mg by mouth nightly as needed   Refills:  0            Where to get your medicines      Some of these will need a paper prescription  and others can be bought over the counter. Ask your nurse if you have questions.     Bring a paper prescription for each of these medications     HYDROcodone-acetaminophen 5-325 MG per tablet    senna 8.6 MG tablet                Protect others around you: Learn how to safely use, store and throw away your medicines at www.disposemymeds.org.             Medication List: This is a list of all your medications and when to take them. Check marks below indicate your daily home schedule. Keep this list as a reference.      Medications           Morning Afternoon Evening Bedtime As Needed    calcium carbonate 500 MG chewable tablet   Commonly known as:  TUMS   Take 2 chew tab by mouth daily as needed for heartburn                                HYDROcodone-acetaminophen 5-325 MG per tablet   Commonly known as:  NORCO   Take 1-2 tablets by mouth every 6 hours as needed for moderate to severe pain   Last time this was given:  1 tablet on 1/13/2018  5:31 PM                                magnesium hydroxide 400 MG/5ML suspension   Commonly known as:  MILK OF MAGNESIA   Take 30 mLs by mouth daily as needed for constipation or heartburn                                MELATONIN PO   Take 3 mg by mouth nightly as needed                                senna 8.6 MG tablet   Commonly known as:  SENOKOT   Take 1 tablet by mouth 2 times daily while on narcotics

## 2018-01-13 NOTE — ED NOTES
Triage completed. Assessment completed. IV inserted on first attempt. Blood specimens obtained and sent to the lab. Pt given a bottle of water for CT prep. CT (Kiley) informed that RN must accompany pt to CT in case PRN epinephrine is needed. Pt given urinal with instructions to void urine ASAP.

## 2018-01-13 NOTE — BRIEF OP NOTE
[facility] Brief Operative Note    Pre-operative diagnosis: Appendicitis     Post-operative diagnosis Appendicitis      Procedure: Procedure(s):  Laparoscopic Appendectomy - Wound Class: II-Clean Contaminated   Surgeon(s): Surgeon(s) and Role:     * Salbador Brewer MD - Primary     * Dominique Tierney PA-C - Assisting   Estimated blood loss: 10 mL    Specimens:   ID Type Source Tests Collected by Time Destination   A :  Tissue Appendix SURGICAL PATHOLOGY EXAM Salbador Brewer MD 1/13/2018  4:28 PM       Findings: Same as above      Dominique Tierney PA-C

## 2018-01-13 NOTE — ANESTHESIA PREPROCEDURE EVALUATION
Procedure: Procedure(s):  LAPAROSCOPIC APPENDECTOMY  Preop diagnosis: unknown    No Known Allergies  Past Medical History:   Diagnosis Date     Chronic prostatitis      Past Surgical History:   Procedure Laterality Date     ORTHOPEDIC SURGERY      acl X 2      Social History   Substance Use Topics     Smoking status: Never Smoker     Smokeless tobacco: Never Used     Alcohol use Yes     Prior to Admission medications    Medication Sig Start Date End Date Taking? Authorizing Provider   calcium carbonate (TUMS) 500 MG chewable tablet Take 2 chew tab by mouth daily as needed for heartburn   Yes Unknown, Entered By History   MELATONIN PO Take 3 mg by mouth nightly as needed   Yes Unknown, Entered By History   magnesium hydroxide (MILK OF MAGNESIA) 400 MG/5ML suspension Take 30 mLs by mouth daily as needed for constipation or heartburn   Yes Unknown, Entered By History     Current Facility-Administered Medications Ordered in Epic   Medication Dose Route Frequency Last Rate Last Dose     Patient RECEIVING antibiotic to treat a different condition and it provides ADEQUATE COVERAGE for this surgical procedure.  1 each As instructed Continuous         lidocaine 1 % 1 mL  1 mL Other Q1H PRN         lactated ringers infusion   Intravenous Continuous 25 mL/hr at 01/13/18 1500       No current [facility]-ordered outpatient prescriptions on file.       Patient RECEIVING antibiotic to treat a different condition and it provides ADEQUATE COVERAGE for this surgical procedure.       lactated ringers 25 mL/hr at 01/13/18 1500     Wt Readings from Last 1 Encounters:   01/13/18 93 kg (205 lb)     Temp Readings from Last 1 Encounters:   01/13/18 36.8  C (98.3  F)     BP Readings from Last 6 Encounters:   01/13/18 146/90   02/21/17 118/82   02/23/16 136/80     Pulse Readings from Last 4 Encounters:   01/13/18 80   02/21/17 86   02/23/16 85     Resp Readings from Last 1 Encounters:   01/13/18 20     SpO2 Readings from Last 1 Encounters:    01/13/18 98%     Recent Labs   Lab Test  01/13/18   1230   NA  139   POTASSIUM  3.7   CHLORIDE  107   CO2  27   ANIONGAP  5   GLC  96   BUN  9   CR  0.98   LORE  9.3     No results for input(s): AST, ALT, ALKPHOS, BILITOTAL, LIPASE in the last 26715 hours.  Recent Labs   Lab Test  01/13/18   1230  02/23/16   1605   WBC  10.7   --    HGB  15.3  15.6   PLT  226   --      No results for input(s): ABO, RH in the last 78018 hours.  No results for input(s): INR, PTT in the last 08890 hours.   No results for input(s): TROPI in the last 71266 hours.  No results for input(s): PH, PCO2, PO2, HCO3 in the last 75568 hours.  No results for input(s): HCG in the last 12343 hours.  Recent Results (from the past 744 hour(s))   CT Abdomen Pelvis w Contrast    Narrative    CT ABDOMEN/PELVIS WITH CONTRAST January 13, 2018 1:21 PM     HISTORY: Abdominal pain.    TECHNIQUE: CT abdomen and pelvis with 103 mL Isovue-370 IV. Radiation  dose for this scan was reduced using automated exposure control,  adjustment of the mA and/or kV according to patient size, or iterative  reconstruction technique.    COMPARISON: None.    FINDINGS: Mildly inflamed appendix that is mildly enlarged measuring  0.9 cm series 2 image 58. There is appendiceal wall thickening and  enhancement along with trace adjacent fluid.    No abscess or free air. No other acute abnormality. Liver,  gallbladder, adrenals, spleen, pancreas, and kidneys show no acute  abnormalities.      Impression    IMPRESSION: Acute appendicitis. No abscess. Mildly enlarged and  inflamed appendix measures 0.9 cm.    KIZZY SHAFFER MD       RECENT LABS:   ECG:   ECHO:       Anesthesia Evaluation     .             ROS/MED HX    ENT/Pulmonary:       Neurologic:       Cardiovascular:         METS/Exercise Tolerance:  >4 METS   Hematologic:         Musculoskeletal:         GI/Hepatic:     (+) appendicitis,       Renal/Genitourinary:     (+) Other Renal/ Genitourinary, chronic prostatitis       Endo:          Psychiatric:         Infectious Disease:         Malignancy:         Other:                     Physical Exam  Normal systems: dental    Airway   Mallampati: I  TM distance: >3 FB  Neck ROM: full    Dental     Cardiovascular   Rhythm and rate: regular and normal      Pulmonary    breath sounds clear to auscultation                    Anesthesia Plan      History & Physical Review  History and physical reviewed and following examination; no interval change.    ASA Status:  1 .    NPO Status:  > 8 hours    Plan for General, RSI and ETT with Intravenous and Propofol induction. Maintenance will be Balanced.    PONV prophylaxis:  Ondansetron (or other 5HT-3) and Dexamethasone or Solumedrol       Postoperative Care  Postoperative pain management:  IV analgesics.      Consents  Anesthetic plan, risks, benefits and alternatives discussed with:  Patient..                          .

## 2018-01-13 NOTE — ED PROVIDER NOTES
History     Chief Complaint:  Abdominal Pain      HPI   Everette Yu is a 27 year old male who presents to the emergency department today accompanied by his wife for evaluation of abdominal pain. The patient reports that he developed right lower quadrant and right lateral suprapubic abdominal pain two days ago. He states that his pain worsened yesterday and prevented him from sleeping last night. He notes that his abdomen is tender to the touch and states that he has never had this pain in the past. The patient also describes chills, sweating, and exacerbation of his abdominal pain with deep breaths. He denies fever, dysuria, hematuria, or a history of diverticulitis or appendicitis. He denies prior abdominal surgery. He notes that he has taken magnesium and Tums for his pain without relief. Of note, the patient has a history of chronic prostatitis which he states presented with pelvic pain that is dissimilar from his current pain. He reports that he was on ciprofloxacin for 6-7 months for treatment of his prostatitis. He states that he currently has urinary hesitancy. The patient notes that when he was in [place] in 1999, he had a reaction to a hepatitis vaccine and was advised by his physician that he should avoid contrast dye due to a possible cross-reaction.    Allergies:  Contrast Dye  Gadolinium     Medications:    Medications reviewed. No current medications.    Past Medical History:    Chronic prostatitis    Past Surgical History:    Orthopedic surgery - ACL x2    Family History:    History reviewed. No pertinent family history.    Social History:  The patient was accompanied to the ED by his wife Magaly.  Smoking Status: Never Smoker  Alcohol Use: Positive  Marital Status:       Review of Systems   Constitutional: Positive for chills and diaphoresis. Negative for fever.   Gastrointestinal: Positive for abdominal pain (right lower quadrant and right lateral suprapubic).   Genitourinary:  "Negative for dysuria and hematuria.        Positive for urinary hesitancy.   All other systems reviewed and are negative.    Physical Exam     Patient Vitals for the past 24 hrs:   BP Temp Pulse Heart Rate Resp SpO2 Height Weight   01/13/18 1454 146/90 - - 80 20 98 % - -   01/13/18 1355 144/84 - 80 - - 99 % - -   01/13/18 1213 (!) 146/97 98.3  F (36.8  C) 79 - 18 99 % 1.753 m (5' 9\") 93 kg (205 lb)     Physical Exam  General: [facility] comfortably [facility] the [facility]  Head:  The scalp, face, and head appear normal  Eyes:  The pupils are equal, round, and reactive to light    There is no nystagmus    Extraocular muscles are intact    Conjunctivae and sclerae are normal  ENT:    The nose is normal    Pinnae are normal    The oropharynx is normal    Uvula is in the midline  Neck:  Normal range of motion    There is no rigidity noted    There is no midline cervical spine pain/tenderness    Trachea is in the midline    No mass is detected  CV:  Regular rate and underlying rhythm     Normal S1/S2, no S3/S4    No pathological murmur detected  Resp:  Lungs are clear    There is no tachypnea    Non-labored    No rales    No wheezing   GI:  Abdomen is soft, there is no rigidity    There is pain/tenderness in the right lower quadrant and right lateral suprapubic region. The upper abdominal quadrants are benign. The left lower quadrant is non-tender. There is no pain in the inguinal regions or pubic symphysis. There is no referred pain into the scrotum or testicles.    No distension    No tympani    No rebound    Non-surgical without peritoneal features  MS:  Normal muscular tone    Symmetric motor strength    No major joint effusions    No asymmetric leg swelling, no calf tenderness  Skin:  No rash or acute skin lesions noted  Neuro: Speech is normal and fluent  Psych:  Awake. Alert.      Normal affect.  Appropriate interactions.  Lymph: No anterior cervical lymphadenopathy noted. There is no inguinal lymph adenopathy.    Emergency " Department Course     Imaging:  Radiology findings were communicated with the patient who voiced understanding of the findings.    CT Abdomen Pelvis w Contrast  Acute appendicitis. No abscess. Mildly enlarged and  inflamed appendix measures 0.9 cm.  Reading per radiology    Laboratory:  Laboratory findings were communicated with the patient who voiced understanding of the findings.    CBC: WBC 10.7, HGB 15.3,   BMP: WNL (Creatinine 0.98)  CRP inflammation: 16.5 (H)  UA with Microscopic: Ketones 5 (A), Mucous Present (A), o/w WNL    Interventions:  1420 Invanz 1 g IV  1439 Morphine 6 mg IV    Emergency Department Course:  Nursing notes and vitals reviewed.  I performed an exam of the patient as documented above.   The patient was sent for a CT Abdomen Pelvis with Contrast while in the emergency department, results above.  IV was inserted and blood was drawn for laboratory testing, results above.   1244 I spoke with Kiley, the CT technician, regarding the patient's CT and the plan regarding the use of contrast dye.  1334 I rechecked the patient and updated him regarding his imaging results.  1350 I spoke with Dr. Brewer of the general surgery service regarding patient's presentation, findings, and plan of care. He accepts care of the patient and will take him to the OR for surgery.  1353 I updated the patient regarding my conversation with Dr. Brewer.  1430 Dr. Brewer saw the patient in the emergency department.  I personally reviewed the imaging and laboratory results with the patient and answered all related questions prior to transfer to the OR.    Patient tolerated the CAT scan without difficulty, there was no evidence of contrast allergy.    Impression & Plan      Medical Decision Making:  Patient presents with right lower quadrant abdominal pain for nearly 48 hours.  CT confirms acute appendicitis.  Broad-spectrum antibiotics have been administered and the on-call surgeon has been notified.  The patient will be  transferred to the operating room.    Diagnosis:    ICD-10-CM    1. Acute appendicitis, unspecified acute appendicitis type K35.80        Disposition:  The patient is transferred to the OR under the care of Dr. Brewer.    Scribe Disclosure:  I, Dennis Vidales, am serving as a scribe at 12:15 PM on 1/13/2018 to document services personally performed by Magdiel Mejia MD based on my observations and the provider's statements to me.   EMERGENCY DEPARTMENT       Magdiel Mejia MD  01/13/18 1554

## 2018-01-14 ENCOUNTER — TELEPHONE (OUTPATIENT)
Dept: OTHER | Facility: CLINIC | Age: 28
End: 2018-01-14

## 2018-01-14 DIAGNOSIS — G89.18 ACUTE POST-OPERATIVE PAIN: Primary | ICD-10-CM

## 2018-01-14 RX ORDER — ONDANSETRON 4 MG/1
4-8 TABLET, ORALLY DISINTEGRATING ORAL EVERY 8 HOURS PRN
Qty: 20 TABLET | Refills: 1 | Status: SHIPPED | OUTPATIENT
Start: 2018-01-14 | End: 2018-01-14

## 2018-01-14 RX ORDER — OXYCODONE AND ACETAMINOPHEN 5; 325 MG/1; MG/1
1 TABLET ORAL EVERY 4 HOURS PRN
Qty: 30 TABLET | Refills: 0 | Status: SHIPPED | OUTPATIENT
Start: 2018-01-14 | End: 2018-01-14

## 2018-01-14 RX ORDER — ONDANSETRON 4 MG/1
4-8 TABLET, ORALLY DISINTEGRATING ORAL EVERY 8 HOURS PRN
Qty: 20 TABLET | Refills: 1 | Status: SHIPPED | OUTPATIENT
Start: 2018-01-14

## 2018-01-14 RX ORDER — OXYCODONE AND ACETAMINOPHEN 5; 325 MG/1; MG/1
1 TABLET ORAL EVERY 4 HOURS PRN
Qty: 30 TABLET | Refills: 0 | Status: SHIPPED | OUTPATIENT
Start: 2018-01-14

## 2018-01-14 NOTE — TELEPHONE ENCOUNTER
Everette wife, Magaly called in as Everette is having significant nausea and vomiting following taking norco after his appendectomy yesterday. He has not had any pain medications since last night at 11pm. He is not having fevers and has pain at incision sites. He has used percocet in the past without nausea. I recommend trying percocet and zofran. I will print Rx and they will  at the [address] [facility]. If he continues to have n/v despite medication change- he should be evaluated in the ED.     Macrina Tran MD  [facility]  [phone]

## 2018-01-14 NOTE — ANESTHESIA POSTPROCEDURE EVALUATION
Patient: Everette Yu    Procedure(s):  Laparoscopic Appendectomy - Wound Class: II-Clean Contaminated    Diagnosis:unknown  Diagnosis Additional Information: No value filed.    Anesthesia Type:  General, RSI    Note:  Anesthesia Post Evaluation    Patient location during evaluation: PACU  Patient participation: Able to fully participate in evaluation  Level of consciousness: awake  Pain management: adequate  Airway patency: patent  Cardiovascular status: acceptable  Respiratory status: acceptable  Hydration status: acceptable  PONV: none     Anesthetic complications: None          Last vitals:  Vitals:    01/13/18 1810 01/13/18 1820 01/13/18 1821   BP:      Pulse:      Resp:   16   Temp:      SpO2: 95% 97% 97%         Electronically Signed By: Lelia Cadena MD, MD  January 13, 2018  11:08 PM

## 2018-01-15 NOTE — OP NOTE
PREOPERATIVE DIAGNOSIS:  Acute appendicitis    POSTOPERATIVE DIAGNOSIS:  Appendicitis    PROCEDURE:   Procedure(s):  LAPAROSCOPIC APPENDECTOMY    ANESTHESIA:  General.      SURGEON:  Salbador Brewer MD    ASSISTANT:  Dominique Tierney PA-C The physician assistant was present for the entirety of the operation. Their assistance was medically necessary for camera management, retraction, and suctioning.     INDICATIONS:  Appendicitis    PROCEDURE:  Patient was taken to the operating suite and uneventfully endotracheally intubated.  Abdomen was prepped and draped in a sterile fashion.  Surgeon initiated timeout was acknowledged.  A small skin incision was made in the infraumbilical area and the abdomen was insufflated with a veress needle. A 5mm trocar was placed. No injury was seen when the camera was placed.  Two other trocars were placed in the usual positions under laparoscopic visualization.  Using 2 long graspers, we were able to identify the cecum and the appendix was identified near the ileocecal valve.  The appendix was inflamed and hyperemic but not ruptured.   We were able to grasp the appendix and elevate it up toward the anterior abdominal wall.  Using a combination of sharp and blunt dissection, we were able to create a window between the appendix and the mesoappendix near its base.  We then fired a 30 mm blue load Endo-LEE stapler across the appendix at its base.  This appeared to be intact.  Following this, we fired a 60 mm white load across the mesoappendix, freeing the appendix from the cecum.  Appendix was placed in an Endocatch bag and removed from the abdomen.  We again inspected our staple lines and these were all found to be intact and hemostatic. We irrigated thoroughly. Hemostasis was good, and all debris was suctioned out.  I removed the larger trocar and reapproximated the fascia with a figure-of-eight 0 Vicryl suture using the Lex-Vashti device.  We then desufflated the abdomen using the  suction  and the trocars were removed.  The skin edges were reapproximated using 4-0 Vicryl and Steri-Strips.  Band-Aids were placed and the patient was awakened.  The patient was uneventfully extubated and taken to PACU in stable condition.  At the conclusion of the case, all  counts were correct.            INTRAOPERATIVE FINDINGS:  appendicitis    Salbador Brewer

## 2018-01-16 LAB — COPATH REPORT: NORMAL

## 2018-01-24 ENCOUNTER — TELEPHONE (OUTPATIENT)
Dept: SURGERY | Facility: CLINIC | Age: 28
End: 2018-01-24

## 2018-01-24 NOTE — TELEPHONE ENCOUNTER
Name of caller: Patient    Reason for Call:  questions    Surgeon:  Dr. Brewer     Recent Surgery:  Yes.    If yes, when & what type:  Lap appy.01/13/18      Best phone number to reach pt at is: [phone]  Ok to leave a message with medical info? Yes.    Pharmacy preferred (if calling for a refill): na

## 2018-01-24 NOTE — TELEPHONE ENCOUNTER
Patient reports that he has some increased discomfort and areas of soreness since increasing physical activity and wondering if this is to be expected, or if he should be seen.    Informed him that it is not unusual to have some discomfort after abdominal surgery when beginning to be physically active again.    Denies any s/s of infection.    Encouraged patient to utilize ice backs and ibuprofen as needed and to ease back into activities.    He agreed and will call with any further questions or concerns.    Erna Abel RN

## 2020-01-12 ENCOUNTER — HOSPITAL ENCOUNTER (EMERGENCY)
Facility: CLINIC | Age: 30
Discharge: HOME | End: 2020-01-12
Attending: EMERGENCY MEDICINE | Admitting: EMERGENCY MEDICINE
Payer: COMMERCIAL

## 2020-01-12 VITALS
OXYGEN SATURATION: 100 % | HEART RATE: 73 BPM | TEMPERATURE: 96.8 F | SYSTOLIC BLOOD PRESSURE: 135 MMHG | DIASTOLIC BLOOD PRESSURE: 93 MMHG | RESPIRATION RATE: 17 BRPM | WEIGHT: 205 LBS | HEIGHT: 70 IN | BODY MASS INDEX: 29.35 KG/M2

## 2020-01-12 DIAGNOSIS — K92.0 HEMATEMESIS WITH NAUSEA: ICD-10-CM

## 2020-01-12 LAB
ABO + RH BLD: NORMAL
ABO + RH BLD: NORMAL
ALBUMIN SERPL-MCNC: 4 G/DL (ref 3.4–5)
ALP SERPL-CCNC: 62 U/L (ref 40–150)
ALT SERPL W P-5'-P-CCNC: 35 U/L (ref 0–70)
ANION GAP SERPL CALCULATED.3IONS-SCNC: 6 MMOL/L (ref 3–14)
AST SERPL W P-5'-P-CCNC: 17 U/L (ref 0–45)
BASOPHILS # BLD AUTO: 0 10E9/L (ref 0–0.2)
BASOPHILS NFR BLD AUTO: 0.2 %
BILIRUB SERPL-MCNC: 0.3 MG/DL (ref 0.2–1.3)
BLD GP AB SCN SERPL QL: NORMAL
BLOOD BANK CMNT PATIENT-IMP: NORMAL
BUN SERPL-MCNC: 17 MG/DL (ref 7–30)
CALCIUM SERPL-MCNC: 9.5 MG/DL (ref 8.5–10.1)
CHLORIDE SERPL-SCNC: 105 MMOL/L (ref 94–109)
CO2 SERPL-SCNC: 29 MMOL/L (ref 20–32)
CREAT SERPL-MCNC: 1.02 MG/DL (ref 0.66–1.25)
DIFFERENTIAL METHOD BLD: ABNORMAL
EOSINOPHIL # BLD AUTO: 0.1 10E9/L (ref 0–0.7)
EOSINOPHIL NFR BLD AUTO: 0.5 %
ERYTHROCYTE [DISTWIDTH] IN BLOOD BY AUTOMATED COUNT: 12.9 % (ref 10–15)
GFR SERPL CREATININE-BSD FRML MDRD: >90 ML/MIN/{1.73_M2}
GLUCOSE SERPL-MCNC: 129 MG/DL (ref 70–99)
HCT VFR BLD AUTO: 44.6 % (ref 40–53)
HEMOCCULT STL QL: NEGATIVE
HGB BLD-MCNC: 15.2 G/DL (ref 13.3–17.7)
IMM GRANULOCYTES # BLD: 0.1 10E9/L (ref 0–0.4)
IMM GRANULOCYTES NFR BLD: 0.5 %
INR PPP: 0.93 (ref 0.86–1.14)
INTERPRETATION ECG - MUSE: NORMAL
LIPASE SERPL-CCNC: 67 U/L (ref 73–393)
LYMPHOCYTES # BLD AUTO: 1.4 10E9/L (ref 0.8–5.3)
LYMPHOCYTES NFR BLD AUTO: 11.2 %
MCH RBC QN AUTO: 30.5 PG (ref 26.5–33)
MCHC RBC AUTO-ENTMCNC: 34.1 G/DL (ref 31.5–36.5)
MCV RBC AUTO: 89 FL (ref 78–100)
MONOCYTES # BLD AUTO: 0.9 10E9/L (ref 0–1.3)
MONOCYTES NFR BLD AUTO: 7.3 %
NEUTROPHILS # BLD AUTO: 10.2 10E9/L (ref 1.6–8.3)
NEUTROPHILS NFR BLD AUTO: 80.3 %
NRBC # BLD AUTO: 0 10*3/UL
NRBC BLD AUTO-RTO: 0 /100
PLATELET # BLD AUTO: 230 10E9/L (ref 150–450)
POTASSIUM SERPL-SCNC: 3.6 MMOL/L (ref 3.4–5.3)
PROT SERPL-MCNC: 7.3 G/DL (ref 6.8–8.8)
RBC # BLD AUTO: 4.99 10E12/L (ref 4.4–5.9)
SODIUM SERPL-SCNC: 140 MMOL/L (ref 133–144)
SPECIMEN EXP DATE BLD: NORMAL
WBC # BLD AUTO: 12.7 10E9/L (ref 4–11)

## 2020-01-12 PROCEDURE — 96374 THER/PROPH/DIAG INJ IV PUSH: CPT

## 2020-01-12 PROCEDURE — 82272 OCCULT BLD FECES 1-3 TESTS: CPT | Performed by: EMERGENCY MEDICINE

## 2020-01-12 PROCEDURE — 25000128 H RX IP 250 OP 636: Performed by: EMERGENCY MEDICINE

## 2020-01-12 PROCEDURE — 86850 RBC ANTIBODY SCREEN: CPT | Performed by: EMERGENCY MEDICINE

## 2020-01-12 PROCEDURE — 85025 COMPLETE CBC W/AUTO DIFF WBC: CPT | Performed by: EMERGENCY MEDICINE

## 2020-01-12 PROCEDURE — 96361 HYDRATE IV INFUSION ADD-ON: CPT

## 2020-01-12 PROCEDURE — C9113 INJ PANTOPRAZOLE SODIUM, VIA: HCPCS | Performed by: EMERGENCY MEDICINE

## 2020-01-12 PROCEDURE — 25800030 ZZH RX IP 258 OP 636: Performed by: EMERGENCY MEDICINE

## 2020-01-12 PROCEDURE — 86900 BLOOD TYPING SEROLOGIC ABO: CPT | Performed by: EMERGENCY MEDICINE

## 2020-01-12 PROCEDURE — 86901 BLOOD TYPING SEROLOGIC RH(D): CPT | Performed by: EMERGENCY MEDICINE

## 2020-01-12 PROCEDURE — 93005 ELECTROCARDIOGRAM TRACING: CPT

## 2020-01-12 PROCEDURE — 80053 COMPREHEN METABOLIC PANEL: CPT | Performed by: EMERGENCY MEDICINE

## 2020-01-12 PROCEDURE — 85610 PROTHROMBIN TIME: CPT | Performed by: EMERGENCY MEDICINE

## 2020-01-12 PROCEDURE — 83690 ASSAY OF LIPASE: CPT | Performed by: EMERGENCY MEDICINE

## 2020-01-12 PROCEDURE — 99284 EMERGENCY DEPT VISIT MOD MDM: CPT | Mod: 25

## 2020-01-12 RX ORDER — PANTOPRAZOLE SODIUM 20 MG/1
20 TABLET, DELAYED RELEASE ORAL DAILY
Qty: 30 TABLET | Refills: 0 | Status: SHIPPED | OUTPATIENT
Start: 2020-01-12 | End: 2020-02-11

## 2020-01-12 RX ORDER — ONDANSETRON 4 MG/1
4 TABLET, ORALLY DISINTEGRATING ORAL EVERY 8 HOURS PRN
Qty: 10 TABLET | Refills: 0 | Status: SHIPPED | OUTPATIENT
Start: 2020-01-12 | End: 2020-01-16

## 2020-01-12 RX ADMIN — PANTOPRAZOLE SODIUM 40 MG: 40 INJECTION, POWDER, FOR SOLUTION INTRAVENOUS at 03:41

## 2020-01-12 RX ADMIN — SODIUM CHLORIDE, POTASSIUM CHLORIDE, SODIUM LACTATE AND CALCIUM CHLORIDE 1000 ML: 600; 310; 30; 20 INJECTION, SOLUTION INTRAVENOUS at 03:41

## 2020-01-12 ASSESSMENT — ENCOUNTER SYMPTOMS
BLOOD IN STOOL: 0
VOMITING: 1
LIGHT-HEADEDNESS: 1
DIZZINESS: 1
ABDOMINAL PAIN: 0

## 2020-01-12 ASSESSMENT — MIFFLIN-ST. JEOR: SCORE: 1891.12

## 2020-01-12 NOTE — ED TRIAGE NOTES
Patient states vomited x3.  2x with dark red blood.  Thinks it was about 1/4 c.    Now feeling lightheaded.

## 2020-01-12 NOTE — ED PROVIDER NOTES
"  History   Chief Complaint:  Hematemesis and Vomiting     HPI   Everette Yu is a 29 year old male who presents with hematemesis and vomiting. The patient states when he returned home an hour ago(0200) he vomited and noted that there was blood in the emesis. He states that he vomited again 10 minutes later and this was only blood. He state he vomited a third time (approx 1/4 cup) and this was a mix of water and blood, so he came to the ED.  Since vomiting he feels lightheaded and dizzy. He states that he was drinking before he vomited reporting he had 4 beers throughout the day, but states he did not feel like the emesis was related to alcohol consumption. The patient reports that he has one to two beers daily. The patient denies frequent use of tylenol or ibuprofen. He denies a history of liver failure. The patient denies abdominal pain or blood in his stool. The patient does report that he smokes marijuana.     Allergies:  Iodinated diagnostic agents     Medications:   No daily medication.     Past Medical History:    Chronic prostatitis   ACL tear    Past Surgical History:    Laparoscopic appendectomy  ACL repair x2      Family History:   History reviewed. No pertinent family history.    Social History:  Smoking Status: Never Smoker  Smokeless Tobacco: Never Used  Alcohol Use: Yes  Drug Use: yes   Marijuana   PCP: Clinic, [facility]     Review of Systems   Gastrointestinal: Positive for vomiting (hematemesis ). Negative for abdominal pain and blood in stool.   Neurological: Positive for dizziness and light-headedness.   All other systems reviewed and are negative.    Physical Exam     Patient Vitals for the past 24 hrs:   BP Temp Temp src Heart Rate Resp SpO2 Height Weight   01/12/20 0255 (!) 155/92 96.8  F (36  C) Temporal 90 16 100 % 1.778 m (5' 10\") 93 kg (205 lb)       Physical Exam  Gen: Pleasant, appears stated age.    Eye:   Pupils are equal, round, and reactive.     Sclera " non-injected.   No conjunctival pallor.    ENT:   Moist mucus membranes.     Normal tongue.    Oropharynx without lesions.    Cardiac:     Normal rate and regular rhythm.    No murmurs, gallops, or rubs.    Pulmonary:     Clear to auscultation bilaterally.    No wheezes, rales, or rhonchi.    Abdomen:     Normal active bowel sounds.     Abdomen is soft and non-distended, without focal tenderness.    Brown stool noted on rectal exam.     Musculoskeletal:     Normal movement of all extremities without evidence for deficit.    Extremities:    No edema.    Skin:   Warm and dry.    Neurologic:    Non-focal exam without asymmetric weakness or numbness.    Normal tone    Psychiatric:     Normal affect with appropriate interaction with examiner.    Emergency Department Course   ECG:  ECG taken at 0338, ECG read at 0352  Normal sinus rhythm  T wave abnormality, consider inferior ischemia  Abnormal ECG  Rate 83 bpm. NM interval 143 ms. QRS duration 90 ms. QT/QTc 378/444 ms. P-R-T axes 20 14 -18.    Laboratory:  Laboratory findings were communicated with the patient who voiced understanding of the findings.    CBC: WBC 12.7(H), HGB 15.2,   CMP: glucose 129(H) o/w WNL (Creatinine 1.02)  INR: 0.93  Lipase: 67(L)  ABO/RH type and screen: O+, antibody negative    Occult blood stool: negative     Interventions:  0341 lactated ringers 1000 mL IV  0341 Protonix 40 mg IV    Emergency Department Course:  Nursing notes and vitals reviewed.    0313 I performed an exam of the patient as documented above.     IV was inserted and blood was drawn for laboratory testing, results above.     0430 I returned to update the patient.     Findings and plan explained to the Patient. Patient discharged home with instructions regarding supportive care, medications, and reasons to return. The importance of close follow-up was reviewed. The patient was prescribed Zofran and Protonix.      Impression & Plan    Medical Decision Making:  Everette Richards  Gigi is a 29 year old healthy male who presents to the emergency department today for evaluation of hematemesis.  He is low risk for variceal bleed based on history.  Differential also includes AVM, bleeding ulcer, Dieulafoy lesion, Radha-Abdul tear, and gastritis.  He has a benign abdominal exam and normal vitals.  Hemoglobin is normal.  No melena or occult blood.  Fairland-Blatchford score is 0.  Given he is otherwise healthy, I recommended outpatient follow up with gastroenterology for endoscopy and placed a referral.  I prescribed scheduled medication for stomach acid reduction with protonix, and we discussed avoiding caffeine, alcohol, acidic foods.  He will return for persistent hematemesis, melena, abdominal pain, syncope, or other concerns.      Diagnosis:    ICD-10-CM    1. Hematemesis with nausea K92.0 GASTROENTEROLOGY ADULT REF CONSULT ONLY       Disposition:   discharged to home    Discharge Medications:  New Prescriptions    ONDANSETRON (ZOFRAN ODT) 4 MG ODT TAB    Take 1 tablet (4 mg) by mouth every 8 hours as needed for nausea or vomiting    PANTOPRAZOLE (PROTONIX) 20 MG EC TABLET    Take 1 tablet (20 mg) by mouth daily for 30 doses       Scribe Disclosure:  Etta LUKE, am serving as a scribe at 3:09 AM on 1/12/2020 to document services personally performed by Jessica Miles MD based on my observations and the provider's statements to me.   [facility] EMERGENCY DEPARTMENT       Jessica Miles MD  01/12/20 [id number]

## 2020-01-12 NOTE — DISCHARGE INSTRUCTIONS
Return to the ED for recurrent vomiting with blood, dark/tarry/black stools, abdominal pain, fever, or other concerns.

## 2020-01-12 NOTE — ED AVS SNAPSHOT
Emergency Department  [address] [place] [address]  Phone:  [phone]  Fax:  [phone]                                    Everette Yu   MRN: [id number]    Department:   Emergency Department   Date of Visit:  1/12/2020           After Visit Summary Signature Page    I have received my discharge instructions, and my questions have been answered. I have discussed any challenges I see with this plan with the nurse or doctor.    ..........................................................................................................................................  Patient/Patient Representative Signature      ..........................................................................................................................................  Patient Representative Print Name and Relationship to Patient    ..................................................               ................................................  Date                                   Time    ..........................................................................................................................................  Reviewed by Signature/Title    ...................................................              ..............................................  Date                                               Time          22EPIC Rev 08/18

## 2021-01-14 ENCOUNTER — NURSE TRIAGE (OUTPATIENT)
Dept: NURSING | Facility: CLINIC | Age: 31
End: 2021-01-14

## 2021-01-14 NOTE — TELEPHONE ENCOUNTER
Pt called in sates he has head injury.  The injury happened yesterday 4:40 PM at work place.  The Pt metal structure that was hung on the wall.  No loss of consciousness.  It is at the back of his head.  No bleeding.  Has little swelling.  There is no pain.  Feels lightheaded and nausea at this time.  No vomit.  Has mild headache.  The disposition is to home care.  Care advice given per protocol.  Patient agrees with care advice given.   Agreed to call back if he has additional symptoms or questions.    Nehemias Simental Winthrop Nurse Advisor 1/14/2021 2:27 PM      Additional Information    Negative: ACUTE NEUROLOGIC SYMPTOM and symptom present now    Negative: Knocked out (unconscious) > 1 minute    Negative: Seizure (convulsion) occurred (Exception: prior history of seizures and now alert and without Acute Neurologic Symptoms)    Negative: Neck pain after dangerous injury (e.g., MVA, diving, trampoline, contact sports, fall > 10 feet or 3 meters) (Exception: neck pain began > 1 hour after injury)    Negative: Major bleeding (actively dripping or spurting) that can't be stopped    Negative: Penetrating head injury (e.g., knife, gunshot wound, metal object)    Negative: Sounds like a life-threatening emergency to the triager    Negative: Recently examined and diagnosed with a concussion by a healthcare provider and has questions about concussion symptoms    Negative: Can't remember what happened (amnesia)    Negative: Vomiting once or more    Negative: Watery or blood-tinged fluid dripping from the nose or ears    Negative: ACUTE NEUROLOGIC SYMPTOM and now fine    Negative: Knocked out (unconscious) < 1 minute and now fine    Negative: Severe headache    Negative: Dangerous injury (e.g., MVA, diving, trampoline, contact sports, fall > 10 feet or 3 meters) or severe blow from hard object (e.g., golf club or baseball bat)    Negative: Large swelling or bruise > 2 inches (5 cm)    Negative: Skin is split open or gaping  (length > 1/2 inch or 12 mm)    Negative: Bleeding won't stop after 10 minutes of direct pressure (using correct technique)    Negative: One or two 'black eyes' (bruising, purple color of eyelids)    Negative: Taking Coumadin (warfarin) or other strong blood thinner, or known bleeding disorder (e.g., thrombocytopenia)    Negative: Age over 65 years with and area of head swelling or bruise    Negative: Sounds like a serious injury to the triager    Negative: No prior tetanus shots (or is not fully vaccinated) and any wound (e.g., cut or scrape)    Negative: Patient is confused or is an unreliable provider of information (e.g., dementia, profound mental retardation, alcohol intoxication)    Negative: Patient wants to be seen    Minor head injury    Negative: Last tetanus shot > 5 years ago and DIRTY cut or scrape    Negative: Last tetanus shot >10 years ago and CLEAN cut or scrape    Negative: After 3 days and headache persists    Negative: Suspicious history for the injury    Protocols used: HEAD INJURY-A-OH

## 2024-12-02 ENCOUNTER — HOSPITAL ENCOUNTER (EMERGENCY)
Facility: CLINIC | Age: 34
Discharge: HOME | End: 2024-12-02
Attending: EMERGENCY MEDICINE | Admitting: EMERGENCY MEDICINE

## 2024-12-02 VITALS
SYSTOLIC BLOOD PRESSURE: 154 MMHG | HEIGHT: 69 IN | OXYGEN SATURATION: 97 % | BODY MASS INDEX: 29.62 KG/M2 | DIASTOLIC BLOOD PRESSURE: 105 MMHG | WEIGHT: 200 LBS | RESPIRATION RATE: 18 BRPM | TEMPERATURE: 97.1 F | HEART RATE: 91 BPM

## 2024-12-02 DIAGNOSIS — R44.3 HALLUCINATIONS: ICD-10-CM

## 2024-12-02 DIAGNOSIS — F41.9 ANXIETY: ICD-10-CM

## 2024-12-02 DIAGNOSIS — F99 INSOMNIA DUE TO OTHER MENTAL DISORDER: ICD-10-CM

## 2024-12-02 DIAGNOSIS — F51.05 INSOMNIA DUE TO OTHER MENTAL DISORDER: ICD-10-CM

## 2024-12-02 DIAGNOSIS — F29 PSYCHOSIS, UNSPECIFIED PSYCHOSIS TYPE (H): ICD-10-CM

## 2024-12-02 PROCEDURE — 99284 EMERGENCY DEPT VISIT MOD MDM: CPT

## 2024-12-02 PROCEDURE — 99284 EMERGENCY DEPT VISIT MOD MDM: CPT | Performed by: EMERGENCY MEDICINE

## 2024-12-02 RX ORDER — MIRTAZAPINE 15 MG/1
15 TABLET, FILM COATED ORAL AT BEDTIME
COMMUNITY
End: 2024-12-02

## 2024-12-02 RX ORDER — OLANZAPINE 5 MG/1
TABLET ORAL
Qty: 30 TABLET | Refills: 0 | Status: SHIPPED | OUTPATIENT
Start: 2024-12-02

## 2024-12-02 ASSESSMENT — ACTIVITIES OF DAILY LIVING (ADL)
ADLS_ACUITY_SCORE: 41

## 2024-12-02 ASSESSMENT — COLUMBIA-SUICIDE SEVERITY RATING SCALE - C-SSRS
1. IN THE PAST MONTH, HAVE YOU WISHED YOU WERE DEAD OR WISHED YOU COULD GO TO SLEEP AND NOT WAKE UP?: NO
2. HAVE YOU ACTUALLY HAD ANY THOUGHTS OF KILLING YOURSELF IN THE PAST MONTH?: NO
6. HAVE YOU EVER DONE ANYTHING, STARTED TO DO ANYTHING, OR PREPARED TO DO ANYTHING TO END YOUR LIFE?: NO

## 2024-12-02 NOTE — CONSULTS
"Diagnostic Evaluation Consultation  Crisis Assessment    Patient Name: Everette Yu  Age:  34 year old  Legal Sex: male  Gender Identity: male  Pronouns: He/Him/His  Race: Choose not to Answer  Ethnicity:  or   Language: English      Patient was assessed: In person   Crisis Assessment Start Date: 12/02/24  Crisis Assessment Start Time: 1540  Crisis Assessment Stop Time: 1610  Patient location: [facility] EMERGENCY DEPT                                 Referral Data and Chief Complaint  Everette Yu presents to the ED with family/friends. Patient is presenting to the ED for the following concerns: Paranoia, Anxiety, Other (see comment), Significant behavioral change (insomnia and hallucinations.).   Factors that make the mental health crisis life threatening or complex are:  Patient presents to the emergency department for psychiatric evaluation of insomnia, hallucinations, and anxiety. Patient tells [facility] he's been hearing voices for 6-7 months, the voices have been  \"encouraging him\" to help others, he denies the voices tell him to harm himself or anyone else. Per collateral, patient has been preoccupied with space, aliens, UFOs, and conspiriacy theories, he spends hours staring at the stormy. Patient tells [facility] he's always had a strong interest in those topics but has recently become more consumed by after the election. Patient reports he smokes marijuana daily, he has cut back the last week due to his symptoms. Patient was assessed last night at [facility] [place] with similar concerns and presentation, he was provided a prescription for Remeron and discharged home w/ his wife. Patient denies SI/HI and has never been hospitalized for his mental health..      Informed Consent and Assessment Methods  Explained the crisis assessment process, including applicable information disclosures and limits to confidentiality, assessed understanding of the process, and obtained consent to " proceed with the assessment.  Assessment methods included conducting a formal interview with patient, review of medical records, collaboration with medical staff, and obtaining relevant collateral information from family and community providers when available.  : done     Patient response to interventions: acceptance expressed  Coping skills were attempted to reduce the crisis:  Pt was willing to try medication to target his symptoms.     History of the Crisis   Pt is a 34 year old  man with no documented mental health history or diagnosis. Pt has never been hospitalized for her mental health, he doesn't take any medications and has no outpatient  supports.    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.)  Source of Income:  salary/wages  Financial Environmental Concerns:  none  Current Hobbies:  family functions, outdoor activities, music, social media/computer activities  Barriers in Personal Life:  mental health concerns    Significant Clinical History  Current Anxiety Symptoms:  racing thoughts, obsessions/compulsions, anxious (Pt has been obsessed w/ space, the universe, and UFO's.)  Current Depression/Trauma:  avoidance, sense of doom, difficulty concentrating, impaired decision making, irritable, hopelessness, sadness  Current Somatic Symptoms:  excessive worry, racing thoughts, wandering, anxious  Current Psychosis/Thought Disturbance:  inattentive, elated mood, agitation, grandiosity, auditory hallucinations  Current Eating Symptoms:  loss of appetite, recent weight loss  Chemical Use History:  Alcohol: None, Social  Last Use:: 11/25/24  Benzodiazepines: None  Opiates: None  Cocaine: None  Marijuana: Daily (Pt has cut down on his weed smoking the past week.)  Last Use:: 11/27/24  Other Use: None  Withdrawal Symptoms: Hallucinations  "  Past diagnosis:  No known past diagnosis  Family history:  No known history of mental health or chemical health concerns  Past treatment:  Individual therapy  Details of most recent treatment:  Pt says he attended one session of therapy in 2017.  Other relevant history:  [place] [facility] [place] he was provided a prescription to target sleep concerns and discharged home w/ his wife.       Collateral Information  Is there collateral information: Yes     Collateral information name, relationship, phone number:  Wife/Magaly Richards accompanied  to hospital today.    What happened today: Pt's wife said [place] [facility] [place] similar concerns and presentation, pt was provided a prescription for sleep and sent home. Pt was able to sleep for a few hours last night but woke up feeling groggy and still experiencing delusions about him being Stan and saving the world. Pt wants help.     What is different about patient's functioning: Pt hasn't been sleeping or eating this past weekend. Consumed by aliens, universe, and space. Pt told wife he was the \"2nd coming of Stan\" on Thursday or Friday because he has alien in his DNA. Pt very disorganized, pacing, and more irritable. Pt's mental health has been progressively worse after being laid off his job in October and the election in November.     Concern about alcohol/drug use:  No concerns, pt has been smoking marijuana daily for years. Pt's wife noticed increased use after he lost his job and after the election.    What do you think the patient needs: Pt's wife is unsure, she's never seen her  like this before.     Has patient made comments about wanting to kill themselves/others: no    If d/c is recommended, can they take part in safety/aftercare planning:  yes    Additional collateral information:  Pt smokes marijuana daily, cut back in the last 1-2 weeks. Pt uses alcohol socially, collateral does not have concerns about any other drug use. Pt " has not made any comments about being suicidal or homicidal. Pt's wife feels like she and her kids are safe at home w/ pt. He has been able to shower and care for himself.     Risk Assessment  [place] Suicide Severity Rating Scale Full Clinical Version:  Suicidal Ideation  Q6 Suicide Behavior (Lifetime): no          [place] Suicide Severity Rating Scale Recent:   Suicidal Ideation (Recent)  Q1 Wished to be Dead (Past Month): no  Q2 Suicidal Thoughts (Past Month): no  Level of Risk per Screen: no risks indicated          Environmental or Psychosocial Events: work or task failure, ongoing abuse of substances  Protective Factors: Protective Factors: strong bond to family unit, community support, or employment, intact marriage or domestic partnership, responsibilities and duties to others, including pets and children, lives in a responsibly safe and stable environment, sense of importance of health and wellness, help seeking, good problem-solving, coping, and conflict resolution skills, sense of belonging, cultural, spiritual , or Buddhist beliefs associated with meaning and value in life, constructive use of leisure time, enjoyable activities, resilience (Pt is , they have a 3 and 6 year old.)    Does the patient have thoughts of harming others? Feels Like Hurting Others: no  Previous Attempt to Hurt Others: no  Current presentation:  (Pt presents as alert, calm, and oriented x 3 (person, place, date))  Is the patient engaging in sexually inappropriate behavior?: no    Is the patient engaging in sexually inappropriate behavior?  no        Mental Status Exam   Affect: Flat, Labile  Appearance: Appropriate  Attention Span/Concentration: Inattentive (Pt has his eyes closed for most of the assessment.)  Eye Contact: Avoidant (Pt had his eyes closed for most of the assessment.)    Fund of Knowledge: Appropriate   Language /Speech Content: Fluent  Language /Speech Volume: Soft  Language /Speech Rate/Productions:  Normal  Recent Memory: Variable  Remote Memory: Intact  Mood: Depressed, Sad  Orientation to Person: Yes   Orientation to Place: Yes  Orientation to Time of Day: No  Orientation to Date: Yes     Situation (Do they understand why they are here?): Yes  Psychomotor Behavior: Normal  Thought Content: Other (please comment) (Pt did not endorse active AVH, SI or HI.)  Thought Form: Intact          Medication  Psychotropic medications:   Medication Orders - Psychiatric (From admission, onward)      Start     Dose/Rate Route Frequency Ordered Stop    12/02/24 0000  OLANZapine (ZYPREXA) 5 MG tablet          Oral Multiple Frequencies 12/02/24 1650               Current Care Team  [facility] Care [facility]:  [facility] - Quintin Adams MD as MD (Orthopedics)    Diagnosis  Patient Active Problem List   Diagnosis Code    Knee pain, unspecified laterality M25.569    Abnormal gait R26.9    Psychosis, unspecified psychosis type (H) F29    Anxiety disorder, unspecified F41.9       Primary Problem This Admission  Active Hospital Problems    Psychosis, unspecified psychosis type (H) F29      Anxiety disorder, unspecified F41.9    Clinical Summary and Substantiation of Recommendations   Pt presents in the ED w/ his wife for evaluation of insomnia, auditory hallucinations, and anxiety. Pt was medically cleared in ED and transferred at [facility]. Pt interested in medication management, he will trial olanzapine and follow up with a psychiatry provider on 12/5/23. Pt does not want to stay overnight at [facility] and wants to discharge ASAP. Pt appears to be appropriate for outpatient, community levels of care and does not need to remain in the Emergency Department or hospitalized. Pt was able to safety plan w/ [facility] and agrees to return to the hospital if his symptoms worsen.                    Patient coping skills attempted to reduce the crisis:  Pt was willing to try medication to target his  symptoms.    Disposition  Recommended disposition: Individual Therapy, Medication Management, Observation        Reviewed case and recommendations with attending provider. Attending Name: Yes, Adelita Suarez       Attending concurs with disposition: yes       Patient and/or validated legal guardian concurs with disposition:   no       Final disposition:  discharge    Legal status on admission: Voluntary/Patient has signed consent for treatment    Assessment Details   Total duration spent with the patient: 30 min     CPT code(s) utilized: 58847 - Psychotherapy for Crisis - 60 (30-74*) min    ROGERIO Palacios, Psychotherapist  [facility] - Triage & Transition Services  Callback: [phone]

## 2024-12-02 NOTE — ED PROVIDER NOTES
"EmPATH Unit - Psychiatric Consultation  [facility] Emergency Department    Everette Yu MRN: [id number]   Age: 34 year old YOB: 1990     History     Chief Complaint   Patient presents with    Psychiatric Evaluation     HPI  Everette Yu is a 34 year old male with concerns for a mood disorder further impacted by ongoing cannabis use who presented to the ED with his wife due to concerns for worsening anxiety, insomnia and auditory hallucinations. Patient reports daily marijuana use for the past 15 years and recently reducing use several days ago. In the emergency department, this patient was determined to be medically stable and transferred to the EmPATH unit for psychiatric assessment.  Record review indicates patient was assessed at [facility] yesterday evening and discharged home with rx for Remeron. No prior hx of mental health hospitalizations. They have currently been in the emergency department for 4.5 hours. Everette explains that following his discharge from [facility], he was able to sleep with remeron but that he woke up feeling groggy and states \"my thoughts had not changed.\" He explains that he has been experiencing increasing paranoia and fixations on conspiracy theories. 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 He then becomes fixated on these things.  At times he has wondered if he is a Anabaptism figure or in space. He hears voices that encourage him to save the world. These voices are not distressing yet he tells me \"they're not normal.\" He says he has heard voices on and off for the past 6-7 months but that these recently intensified in the past few days. He reports racing thoughts and that he can't concentrate. This has impacted his ability to sleep and eat. At times this is overwhelming and he has become tearful.  He denies SI/HI and denies VH. He is hoping to start additional medications targeting these symptoms and then " "obtain additional mental health supports. He does not want to remain at [facility] overnight as he wants to return home to his family. He notes his wife and 2 children as his primary protective factors.     Past Medical History  Past Medical History:   Diagnosis Date    Chronic prostatitis      Past Surgical History:   Procedure Laterality Date    LAPAROSCOPIC APPENDECTOMY N/A 1/13/2018    Procedure: LAPAROSCOPIC APPENDECTOMY;  Laparoscopic Appendectomy;  Surgeon: Salbador Brewer MD;  Location: [facility] OR    ORTHOPEDIC SURGERY      acl X 2      OLANZapine (ZYPREXA) 5 MG tablet  MELATONIN PO      No Known Allergies  Family History  No family history on file.  Social History   Social History     Tobacco Use    Smoking status: Never    Smokeless tobacco: Never   Substance Use Topics    Alcohol use: Yes    Drug use: No          Review of Systems  A medically appropriate review of systems was performed with pertinent positives and negatives noted in the HPI, and all other systems negative.    Physical Examination   BP: (!) 154/105  Pulse: 86  Temp: 98.7  F (37.1  C)  Resp: 18  Height: 175.3 cm (5' 9\")  Weight: 94.8 kg (209 lb)  SpO2: 99 %    Physical Exam  General: Appears stated age.   Neuro: Alert and fully oriented. Extremities appear to demonstrate normal strength on visual inspection.   Integumentary/Skin: no rash visualized, normal color    Psychiatric Examination   Appearance: awake, alert, adequately groomed, dressed in hospital scrubs, and appeared as age stated  Attitude:  cooperative  Eye Contact:  good  Mood:  anxious  Affect:  mood congruent and intensity is blunted  Speech:  clear, coherent and normal prosody  Psychomotor Behavior:  no evidence of tardive dyskinesia, dystonia, or tics and intact station, gait and muscle tone  Thought Process:  logical and linear  Associations:  no loose associations  Thought Content:  no evidence of suicidal ideation or homicidal ideation, auditory hallucinations present, and no " visual hallucinations present  Insight:  fair  Judgement:  intact  Oriented to:  time, person, and place  Attention Span and Concentration:  intact  Recent and Remote Memory:  intact  Language: able to name/identify objects without impairment  Fund of Knowledge: intact with awareness of current and past events    ED Course     ED Course as of 12/02/24 1651   Mon Dec 02, 2024   1309 I obtained history and examined the patient as noted above       Labs Ordered and Resulted from Time of ED Arrival to Time of ED Departure - No data to display    Assessments & Plan (with Medical Decision Making)   Patient presenting with concerns for emerging symptoms of psychosis including paranoia, auditory hallucinations, Oriental orthodox preoccupations, racing thoughts and insomnia. Presentation if further exacerbated by ongoing cannabis use. Patient has not formal mental health history. Discussed that various differential dx including substance induced psychosis vs underlying psychotic disorder. Discussed working to abstain from marijuana use. Treatment plan focused on medications further targeting symptoms of psychosis and connecting patient with additional outpatient supports. Nursing notes reviewed noting no acute issues.     I have reviewed the assessment completed by the [facility].     Preliminary diagnosis:    ICD-10-CM    1. Anxiety  F41.9       2. Insomnia due to other mental disorder  F51.05     F99       3. Hallucinations  R44.3       4. Psychosis, unspecified psychosis type (H)  F29            Treatment Plan:  -Start olanzapine 5mg at bedtime and additional 5mg once daily as needed targeting symptoms of psychosis. Discussed room for additional dose optimization pending tolerability. Discussed monitoring for metabolic side effects and movement disorders.   -Medication education provided this visit including but not limited to: Rationale for medication, importance of medication adherence, medication interactions, common medication side  effects, benefits of medications.  -Patient requests to stop mirtazapine given concerns for sedation and grogginess   -Individual psychotherapy and outpatient psychiatric care recommended for additional support and ongoing development of nonpharmacologic coping skills and strategies.    -Referral for psychiatric medication management   -Problem focused supportive therapy and education provided today related to patient's current and acute stressors, symptoms, and diagnoses.  -UDS not collected prior to discharge, advised to refrain from cannabis use  -Discharge to home with OP and crisis supports       --  MAXIMILIANO Bocanegra CNP   [facility] EMERGENCY DEPT  EmPATH Unit       Adelita Suarez APRN CNP  12/02/24 1348

## 2024-12-02 NOTE — ED NOTES
Attempted to bring patient to EmPath unit and he stated he was not interested if his family could not be present. Explained the unit to patient and he again declined. Triage RN informed he would like to be seen in ED with family present.

## 2024-12-02 NOTE — ED NOTES
[facility]  ED to EMPATH Checklist:      Goal for EMPATH: Anxiety management, Hallucinations,  Referral and resources, and Time to stabilize    Current Behavior: Calm and Cooperative    Safety Concerns: Auditory Hallucinations    Legal Hold Status: Voluntary    Medically Cleared by ED provider: Yes    Patient Therapeutically Searched: Not searched - Currently in triage    Belongings: Remain with patient    Independent Ambulation at Baseline: Yes/No: Yes    Participates in Care/Conversation: Yes/No: Yes    Patient Informed about EMPATH: Yes/No: Yes    DEC: Ordered and pending    Patient Ready to be Transferred to EMPATH? Yes/No: Yes

## 2024-12-02 NOTE — DISCHARGE INSTRUCTIONS
Scheduled Appointment    Type: Medication Mgmt - Initial (In-Person)  Date: Thursday, 12/5/2024    Time: 8:30 am - 9:30 am  Provider: Larry Manjarrez DNP, CNP,RN  Location: [facility], [address] [place] [address] [address], [place] [address]  Phone: [phone]  Patient instructions: Initial intake must be in person, but subsequent appointments can be in person or telehealth.    Follow up with established providers and supports as scheduled. Continue taking medications as prescribed. Abstain from drugs and alcohol. Utilize your Count includes the [facility] mental health crisis team as needed. They are available 24/7. Contact information is listed below.     Remember: give the referrals 3 sessions prior to calling it quits. Do you trust them? Do you feel understood? Do you think they can help? Check in with yourself after each session    If I am feeling unsafe or I am in a crisis, I will:   Contact my established care providers   Call the National Suicide Prevention Lifeline: 835.157.2641   Go to the nearest emergency room   Call 931     Warning signs that I or other people might notice when a crisis is developing for me: changes to sleep, appetite or mood, increased anger, agitation or irritability, feeling depressed or hopeless, spending more time alone or talking less, increased crying, decreased productivity, seeing or hearing things that aren't there, thoughts of not wanting to live anymore or of actually killing myself, thoughts of hurting others    Things I am able to do on my own to cope or help me feel better: watching a favorite tv show or movie, listening to music I enjoy, going outside and breathing fresh air, going for a walk or exercising, taking a shower or bath, a cold or hot beverage, a healthy snack, drawing/coloring/painting, journaling, singing or dancing, deep breathing     I can try practicing square breathing when I begin to feel anxious - inhale through the nose for the count of 4 and the first line on  the square. Exhale through the mouth for the count of 4 for the second line of the square. Repeat to complete the square. Repeat the square as many times as needed.    I can also use my five senses to practice mindfulness and grounding. What are five things I can see, four things I can hear, three things I can feel, two things I can smell, and one thing I can taste.     Things that I am able to do with others to cope or help me feel better: sometimes just talking or spending time with someone else, sharing a meal or having coffee, watching a movie or playing a game, going for a walk or exercising    I can also use community resources including mental health hotlines, Critical access hospital crisis teams, or apps.     Things I can use or do for distraction: movies/tv, music, reading, games, drawing/coloring/painting or other art, essential oils, exercise, cleaning/organizing, puzzles, crossword puzzles, word search, Sudoku       I can also download a meditation or relaxation sohan, like Calm, Headspace, or Insight Timer (all three offer a free version)    Changes I can make to support my mental health and wellness: Attend scheduled mental health therapy and psychiatric appointments. Take my medications as prescribed. Maintain a daily schedule/routine. Abstain from all mood altering substances, including drugs, alcohol, or medications not currently prescribed to me. Implement a self-care routine.      People in my life that I can ask for help: friends or family, trusted teachers/staff/colleagues, trusted members of my community or place of Synagogue, mental health crisis lines, or 911    Your Critical access hospital has a mental health crisis team you can call 24/7: [facility], [phone]    Other things that are important when I m in crisis: to remember that the feelings I am having right now are temporary, and it won't feel like this forever, and that it is okay and important to ask for help    Crisis Lines  Crisis Text Line  Text  "324603  You will be connected with a trained live crisis counselor to provide support.    Por alyssia, feliciao  ELAN a 481554 o texto a 442-AYUDAME en WhatsApp    National Hope Line  1.800.SUICIDE [7342282]      Community Resources  Fast Tracker  Linking people to mental health and substance use disorder resources  [employer]ibabybox     [place] Mental Health Warm Line  Peer to peer support  Monday thru Saturday, 12 pm to 10 pm  431.282.3719 or 9.469.493.9204  Text \"Support\" to 94612    National [employer] on Mental Illness (EVY)  283.585.5723 or 1.888.EVY.HELPS      Mental Health Apps  My3  https://[facility].org/    VirtualHopeBox  https://[employer]/apps/virtual-hope-box/      Additional Information  Today you were seen by a licensed mental health professional through Triage and Transition services, [facility] Prov[facility] ([facility])  for a crisis assessment in the Emergency Department at [facility].  It is recommended that you follow up with your established providers (psychiatrist, mental health therapist, and/or primary care doctor - as relevant) as soon as possible. Coordinators from [facility] will be calling you in the next 24-48 hours to ensure that you have the resources you need.  You can also contact [facility] coordinators directly at [phone]. You may have been scheduled for or offered an appointment with a mental health provider. [facility] maintains an extensive network of licensed behavioral health providers to connect patients with the services they need.  We do not charge providers a fee to participate in our referral network.  We match patients with providers based on a patient's specific needs, insurance coverage, and location.  Our first effort will be to refer you to a provider within your care system, and will utilize providers outside your care system as needed.        "

## 2024-12-02 NOTE — ED NOTES
Patient agreeable to discharge plan. Discharge instructions reviewed with patient including follow-up care plan. Medications: Remeron was discharge and he was started on Zyprexa 5 mg at HS. Patient was instructed to take one as needed during the day if needed. Reviewed safety plan and outpatient resources. Denies SI and HI. Patient requesting to wait out in the lobby for his wife to pick him up. Wife as been notified of patient's location. No safety concerns so patient is free to wait out in triage voluntarily. All belongings that were brought into the hospital have been returned to patient. Escorted off the unit at 1715 accompanied by [facility] staff. Discharged to home via wife.

## 2024-12-02 NOTE — ED TRIAGE NOTES
Pt reports increased stressors regarding global politics. Wife reports over the past week pt has had decreased appetite, along with having visual and auditory hallucinations. Wife notes they [place] [facility] [place] and pt was given sleep medications.      Triage Assessment (Adult)       Row Name 12/02/24 1241          Triage Assessment    Airway WDL WDL        Respiratory WDL    Respiratory WDL WDL        Skin Circulation/Temperature WDL    Skin Circulation/Temperature WDL WDL        Cardiac WDL    Cardiac WDL WDL        Peripheral/Neurovascular WDL    Peripheral Neurovascular WDL WDL        Cognitive/Neuro/Behavioral WDL    Cognitive/Neuro/Behavioral WDL WDL

## 2024-12-02 NOTE — ED NOTES
"34 year old male received from ED due to anxiety, insomnia, hallucinations. Reports being at [facility] last night for same presentation. Was discharge home on Remeron 15 mg. Patient comes in with his wife Magaly for what seems to be a first episode of psychosis. Patient states that he was walking around the house the other day \"saying scary things about politics and was not himself\". Patient \"thought he was Stan\". Appears to have insight because he could recall this incident and now does not believe these things to be true. Patient states that symptoms have been worsening due to social media. 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 States this episode scared his children, which prompted him and his wife to call [facility], then went to [facility]. Patient also states he has been sleeping approx. 2 hours per night. Patient also reports that a similar episode happened 7 months ago, but did not seek out help then. Denies SI/HI and active hallucinations, but states he has been seeing things that he cannot identify and hearing voices that are non-command. He states, the voices say negative things regarding politics. Acknowledges the recent election has been triggering. Patient appears to be fearful, anxious and depressed in mood. Affect is tearful. Eager to get help, specifically regarding medications management. Hoping to discharge tonight with his wife, as he identifies her as a protective factor.     Nursing and risk assessments completed. Assessments reviewed with [facility] and physician. Admission information reviewed with patient. Patient given a tour of [facility] and instructions on using the facility. Questions regarding [facility] addressed. Pt safety search completed.   "

## 2024-12-02 NOTE — ED PROVIDER NOTES
"  Emergency Department Note      History of Present Illness     Chief Complaint   Psychiatric Evaluation      HPI   Everette Yu is a 34 year old male who presents for psychiatric evaluation. The patient reports that for the last week he has been having increased visual and auditory hallucinations, anxiety, depression, and loss of sleep. His girlfriend reports that he has been having delusions of being the second coming of Jefe, and he believes that the weight of the world is on his shoulders. She states that he has been pacing anxiously while sweating frequently. She adds that he was evaluated at [facility] yesterday who gave him medications to help him sleep, and he initially was back to his baseline mentation. However, the patient quickly returned to his poor mental state, so they present to the ED for further psychiatric evaluation. Girlfriend notes that he has been using heavy amounts of marijuana in the last 3 weeks, but this past week he stopped using it altogether. The patient denies regular alcohol or street drug use. He denies suicidal/homicidal ideation.    Independent Historian   Girlfriend as detailed above.    Review of External Notes   I reviewed the patient's ED visit note from yesterday, 12/1/24 for mood disorder    Past Medical History     Medical History and Problem List   Chronic prostatitis    Medications   Remeron    Surgical History   Laparoscopic appendectomy  ACL repair x2    Physical Exam     Patient Vitals for the past 24 hrs:   Temp Temp src Pulse Resp SpO2 Height Weight   12/02/24 1242 98.7  F (37.1  C) Temporal 86 18 99 % 1.753 m (5' 9\") 94.8 kg (209 lb)     Physical Exam  General: No acute distress.  Head: No obvious trauma to head.  Eyes:  Conjunctivae clear.   Neck: Normal range of motion.   CV: Skin is well perfused, no cyanosis  Respiratory: Effort normal. No audible wheezing.  Gastrointestinal: Non-distended.  Musculoskeletal: Normal range of motion. No gross " deformities.  Neuro: Alert. Moving all extremities appropriately.  Normal speech.    Skin: No rashes or lesions on exposed skin.  Psych: No suicidal ideation. Endorses auditory and visual hallucinations.    Diagnostics     Lab Results   Labs Ordered and Resulted from Time of ED Arrival to Time of ED Departure - No data to display    Imaging   No orders to display       Independent Interpretation   None    ED Course      Medications Administered   Medications - No data to display      Discussion of Management   None    ED Course   ED Course as of 12/02/24 1320   Mon Dec 02, 2024   1309 I obtained history and examined the patient as noted above       Additional Documentation  None    Medical Decision Making / Diagnosis     CMS Diagnoses: None    MIPS       None    MDM   Everette Yu is a 34 year old male ***    Disposition   The patient was transferred to [facility].     Diagnosis   No diagnosis found.       Scribe Disclosure:  I, Zheng Langley, am serving as a scribe at 12:53 PM on 12/2/2024 to document services personally performed by Keaton Grady MD based on my observations and the provider's statements to me.

## 2024-12-03 NOTE — PLAN OF CARE
Everette ROHINI RichardsLiz  December 2, 2024  Plan of Care Hand-off Note     Patient Care Path: discharge    Plan for Care:     Pt presents in the ED w/ his wife for evaluation of insomnia, auditory hallucinations, and anxiety.  Pt reports he has been hearing voices for 6-7 months but symptoms have worsened over the weekend. Patient told his wife he believes he is Jefe and his DNA is part alien. Pt has been preoccupied w/ space, the universe, aliens, and conspiracy theories. He spends most of his time searching the internet for more information. Pt reports the recent election as a significant stressor, he was also laid off from his part-time job in October but denies this has caused any concerns for him. Pt has never been hospitalized for his mental health and does not take any medications.Pt was medically cleared in ED and transferred at [facility]. Pt interested in medication management, he will trial olanzapine and follow up with a psychiatry provider on 12/5/23. Pt declined therapeutic supports at this time and wants to focus on sleep. Pt does not want to stay overnight at [facility] and is requesting discharge home to his wife and 2 kids. Pt appears to be appropriate for outpatient, community levels of care and does not need to remain in the Emergency Department or hospitalized. Pt was able to safety plan w/ LM and agrees to return to the hospital if his symptoms worsen.    Identified Goals and Safety Issues: Medication management, referral/appointment for psychiatry, and safety planning    Overview:  Wife/Magaly Richards  [phone]            Legal Status: Legal Status at Admission: Voluntary/Patient has signed consent for treatment    Psychiatry Consult:       Updated   regarding plan of care.           ROGERIO Palacios

## 2024-12-04 ENCOUNTER — TELEPHONE (OUTPATIENT)
Dept: BEHAVIORAL HEALTH | Facility: CLINIC | Age: 34
End: 2024-12-04

## (undated) DEVICE — ENDO CANNULA 05MM VERSAONE UNIVERSAL UNVCA5STF

## (undated) DEVICE — ENDO TROCAR OPTICAL 05MM VERSAPORT PLUS W/FIX CAN ONB5STF

## (undated) DEVICE — ENDO TROCAR OPTICAL 12MM VERSAPORT PLUS W/FIX CAN ONB12STF

## (undated) DEVICE — GLOVE GAMMEX DERMAPRENE ULTRA SZ 8.5 LF 8517

## (undated) DEVICE — STPL ENDO RELOAD GIA 60X2.5MM ROTIC 030457

## (undated) DEVICE — STPL ENDO RELOAD 30X2.5MM ROTIC 030451

## (undated) DEVICE — ESU HOLDER LAP INST DISP PURPLE LONG 330MM H-PRO-330

## (undated) DEVICE — SUCTION CANISTER MEDIVAC LINER 3000ML W/LID 65651-530

## (undated) DEVICE — SU VICRYL 4-0 PS-2 18" UND J496H

## (undated) DEVICE — SOL NACL 0.9% IRRIG 1000ML BOTTLE 07138-09

## (undated) DEVICE — LINEN TOWEL PACK X5 5464

## (undated) DEVICE — SUCTION IRR STRYKERFLOW II W/TIP 250-070-520

## (undated) DEVICE — ESU GROUND PAD UNIVERSAL W/O CORD

## (undated) DEVICE — SU VICRYL 0 UR-6 27" J603H

## (undated) DEVICE — PACK LAP CHOLE SLC15LCFSD

## (undated) DEVICE — STPL ENDO RELOAD GIA 30 X 3.5MM ROTIC 030452

## (undated) DEVICE — PREP CHLORAPREP 26ML TINTED ORANGE  260815

## (undated) DEVICE — STPL ENDO HANDLE GIA ULTRA UNIVERSAL STD EGIAUSTND

## (undated) DEVICE — SOL NACL 0.9% INJ 1000ML BAG 2B1324X

## (undated) RX ORDER — HYDROMORPHONE HYDROCHLORIDE 1 MG/ML
INJECTION, SOLUTION INTRAMUSCULAR; INTRAVENOUS; SUBCUTANEOUS
Status: DISPENSED
Start: 2018-01-13

## (undated) RX ORDER — DEXAMETHASONE SODIUM PHOSPHATE 4 MG/ML
INJECTION, SOLUTION INTRA-ARTICULAR; INTRALESIONAL; INTRAMUSCULAR; INTRAVENOUS; SOFT TISSUE
Status: DISPENSED
Start: 2018-01-13

## (undated) RX ORDER — BUPIVACAINE HYDROCHLORIDE 2.5 MG/ML
INJECTION, SOLUTION EPIDURAL; INFILTRATION; INTRACAUDAL
Status: DISPENSED
Start: 2018-01-13

## (undated) RX ORDER — FENTANYL CITRATE 50 UG/ML
INJECTION, SOLUTION INTRAMUSCULAR; INTRAVENOUS
Status: DISPENSED
Start: 2018-01-13

## (undated) RX ORDER — PROPOFOL 10 MG/ML
INJECTION, EMULSION INTRAVENOUS
Status: DISPENSED
Start: 2018-01-13

## (undated) RX ORDER — KETOROLAC TROMETHAMINE 30 MG/ML
INJECTION, SOLUTION INTRAMUSCULAR; INTRAVENOUS
Status: DISPENSED
Start: 2018-01-13

## (undated) RX ORDER — HYDROCODONE BITARTRATE AND ACETAMINOPHEN 5; 325 MG/1; MG/1
TABLET ORAL
Status: DISPENSED
Start: 2018-01-13

## (undated) RX ORDER — NEOSTIGMINE METHYLSULFATE 1 MG/ML
VIAL (ML) INJECTION
Status: DISPENSED
Start: 2018-01-13

## (undated) RX ORDER — LIDOCAINE HYDROCHLORIDE 20 MG/ML
INJECTION, SOLUTION EPIDURAL; INFILTRATION; INTRACAUDAL; PERINEURAL
Status: DISPENSED
Start: 2018-01-13

## (undated) RX ORDER — ONDANSETRON 2 MG/ML
INJECTION INTRAMUSCULAR; INTRAVENOUS
Status: DISPENSED
Start: 2018-01-13

## (undated) RX ORDER — GLYCOPYRROLATE 0.2 MG/ML
INJECTION, SOLUTION INTRAMUSCULAR; INTRAVENOUS
Status: DISPENSED
Start: 2018-01-13